# Patient Record
Sex: FEMALE | Race: WHITE | Employment: FULL TIME | URBAN - METROPOLITAN AREA
[De-identification: names, ages, dates, MRNs, and addresses within clinical notes are randomized per-mention and may not be internally consistent; named-entity substitution may affect disease eponyms.]

---

## 2017-01-10 ENCOUNTER — TELEPHONE (OUTPATIENT)
Dept: INTERNAL MEDICINE CLINIC | Facility: CLINIC | Age: 47
End: 2017-01-10

## 2017-01-10 ENCOUNTER — OFFICE VISIT (OUTPATIENT)
Dept: INTERNAL MEDICINE CLINIC | Facility: CLINIC | Age: 47
End: 2017-01-10

## 2017-01-10 VITALS
RESPIRATION RATE: 16 BRPM | HEIGHT: 69 IN | HEART RATE: 73 BPM | SYSTOLIC BLOOD PRESSURE: 146 MMHG | BODY MASS INDEX: 26.96 KG/M2 | DIASTOLIC BLOOD PRESSURE: 90 MMHG | WEIGHT: 182 LBS

## 2017-01-10 DIAGNOSIS — M79.675 PAIN IN TOES OF BOTH FEET: Primary | ICD-10-CM

## 2017-01-10 DIAGNOSIS — M79.674 PAIN IN TOES OF BOTH FEET: Primary | ICD-10-CM

## 2017-01-10 PROCEDURE — 99212 OFFICE O/P EST SF 10 MIN: CPT | Performed by: INTERNAL MEDICINE

## 2017-01-10 RX ORDER — PREDNISONE 20 MG/1
TABLET ORAL
Qty: 6 TABLET | Refills: 0 | Status: SHIPPED | OUTPATIENT
Start: 2017-01-10 | End: 2017-03-02 | Stop reason: ALTCHOICE

## 2017-01-10 NOTE — PROGRESS NOTES
Multiple swollen toes  Is a runner  Had this last year when she had foot sx but no recent foot sx  Was rx by Podiatry with medrol dose pack  Using lamisil for this  Blood pressure 146/90, pulse 73, resp. rate 16, height 5' 9\" (1.753 m), weight 182 lb (82.

## 2017-01-10 NOTE — TELEPHONE ENCOUNTER
Pt stts for the last 3 days her toes have been having excruciating pain in her toes   Pt stts she can't put shoes on her feet.   Please advise

## 2017-01-10 NOTE — TELEPHONE ENCOUNTER
Onset: 3 days. Pt states pain has worsen in the past 3 days. Location: bilateral left and right third toe. Pt described pain as a sharp stabbing pain that occurs only when wearing shoes and/or ambulating.  Pt is a runner and states has been unable to run

## 2017-01-16 ENCOUNTER — OFFICE VISIT (OUTPATIENT)
Dept: PODIATRY CLINIC | Facility: CLINIC | Age: 47
End: 2017-01-16

## 2017-01-16 ENCOUNTER — OFFICE VISIT (OUTPATIENT)
Dept: DERMATOLOGY CLINIC | Facility: CLINIC | Age: 47
End: 2017-01-16

## 2017-01-16 DIAGNOSIS — Z85.828 HISTORY OF NONMELANOMA SKIN CANCER: ICD-10-CM

## 2017-01-16 DIAGNOSIS — D23.60 BENIGN NEOPLASM OF SKIN OF UPPER LIMB, INCLUDING SHOULDER, UNSPECIFIED LATERALITY: ICD-10-CM

## 2017-01-16 DIAGNOSIS — D22.9 MULTIPLE NEVI: Primary | ICD-10-CM

## 2017-01-16 DIAGNOSIS — D23.5 BENIGN NEOPLASM OF SKIN OF TRUNK, EXCEPT SCROTUM: ICD-10-CM

## 2017-01-16 DIAGNOSIS — L97.521: Primary | ICD-10-CM

## 2017-01-16 DIAGNOSIS — L81.4 LENTIGO: ICD-10-CM

## 2017-01-16 DIAGNOSIS — L71.9 ROSACEA: ICD-10-CM

## 2017-01-16 DIAGNOSIS — D23.70 BENIGN NEOPLASM OF SKIN OF LOWER LIMB, INCLUDING HIP, UNSPECIFIED LATERALITY: ICD-10-CM

## 2017-01-16 DIAGNOSIS — D23.30 BENIGN NEOPLASM OF SKIN OF FACE: ICD-10-CM

## 2017-01-16 DIAGNOSIS — D23.4 BENIGN NEOPLASM OF SCALP AND SKIN OF NECK: ICD-10-CM

## 2017-01-16 PROCEDURE — 97597 DBRDMT OPN WND 1ST 20 CM/<: CPT

## 2017-01-16 PROCEDURE — 99212 OFFICE O/P EST SF 10 MIN: CPT | Performed by: DERMATOLOGY

## 2017-01-16 PROCEDURE — 99212 OFFICE O/P EST SF 10 MIN: CPT

## 2017-01-16 PROCEDURE — 99214 OFFICE O/P EST MOD 30 MIN: CPT | Performed by: DERMATOLOGY

## 2017-01-16 RX ORDER — DAPSONE 75 MG/G
GEL TOPICAL
Qty: 90 G | Refills: 12 | Status: SHIPPED | OUTPATIENT
Start: 2017-01-16 | End: 2018-01-10

## 2017-01-16 RX ORDER — FLUOCINONIDE GEL 0.5 MG/G
GEL TOPICAL
Qty: 15 G | Refills: 0 | COMMUNITY
Start: 2017-01-16 | End: 2017-01-19

## 2017-01-16 NOTE — PROGRESS NOTES
HPI:    Patient ID: Stanley Rueda is a 42year old female. Foot Pain   The pain is present in the right toes and left toes (Right 3rd toe - ). This is a new problem. The current episode started 1 to 4 weeks ago (2 weeks).  There has been no history of Mother    • Heart Disorder Father 67     heart attack-cause of death   • Cancer Sister      skin cancer   • Ovarian Cancer Maternal Grandmother 79        Smoking Status: Never Smoker                      Smokeless Status: Never Used likely due to running (shoe rubbing against toes). In office scar tissues were removed at subcutaneous level without any complications.  The patient was recommended to wear OTC inserts (SPENCO INSOLES) all the time as this will reduce pain level and recurre

## 2017-01-19 ENCOUNTER — TELEPHONE (OUTPATIENT)
Dept: DERMATOLOGY CLINIC | Facility: CLINIC | Age: 47
End: 2017-01-19

## 2017-01-19 NOTE — TELEPHONE ENCOUNTER
pt said pharmacy called her and said the tretinoin and the fluocinonide are not covered by ins. pls advise alternative or if we can start a pa for these pls advise

## 2017-01-19 NOTE — TELEPHONE ENCOUNTER
Tretinoin will need pa   For acne, ak's --pt with hx skin ca    For chemoprevention.   Fluocinonide resent rx--looks like this came  up as sample, pt preferred gel, can try cream instead--sub same gm and dir and rf

## 2017-01-30 ENCOUNTER — OFFICE VISIT (OUTPATIENT)
Dept: PODIATRY CLINIC | Facility: CLINIC | Age: 47
End: 2017-01-30

## 2017-01-30 ENCOUNTER — TELEPHONE (OUTPATIENT)
Dept: PODIATRY CLINIC | Facility: CLINIC | Age: 47
End: 2017-01-30

## 2017-01-30 DIAGNOSIS — L97.521: Primary | ICD-10-CM

## 2017-01-30 PROCEDURE — L3000 FT INSERT UCB BERKELEY SHELL: HCPCS

## 2017-01-30 PROCEDURE — 99213 OFFICE O/P EST LOW 20 MIN: CPT

## 2017-01-30 NOTE — PROGRESS NOTES
HPI:    Patient ID: Dion Ramirez is a 42year old female. HPI    1. Right Foot Pain / Traumatic ulcer  The pain is present in the right toes and left toes (Right 3rd toe ). This is a recurrent problem. The current episode 3 weeks ago.  There has been attack-cause of death   • Cancer Sister      skin cancer   • Ovarian Cancer Maternal Grandmother 79        Smoking Status: Never Smoker                      Smokeless Status: Never Used                        Alcohol Use: No                 Comment: Former tissue has been improves since last time as she has been using Orthotic inserts. Denies any new complains. The patient has been casted with partial weight bearing in neutral position. We will contact once we receive Custom orthosis.      No orders of

## 2017-01-31 NOTE — PROGRESS NOTES
Sam Sicard is a 42year old female. HPI:     CC:  Patient presents with:  Lesion: former pt of SD. concerned about crusty lesions to yoni. temples. had them for many years. not changing but \"annoying\"  Full Skin Exam: personal hx of SCC and BCC.  (n Take 75 mg by mouth daily.    Disp:  Rfl: 5   Fluocinonide 0.05 % External Cream Apply twice daily to itchy bumps on side of face Disp: 15 g Rfl: 0   predniSONE 20 MG Oral Tab 2 a day for 2 days then 1 a day for 2 days then stop Disp: 6 tablet Rfl: 0     Al skin cancer   • Ovarian Cancer Maternal Grandmother 70       There were no vitals filed for this visit. HPI:    Patient presents with:  Lesion: former pt of SD. concerned about crusty lesions to yoni. temples. had them for many years.  not changing but \" for acne      Tretinoin 0.05 % External Cream 20 g 3      Sig: Apply to the face, acne  as directed at bedtime.              Multiple nevi  (primary encounter diagnosis)  Lentigo  Benign neoplasm of scalp and skin of neck  Benign neoplasm of skin of face  B reviewed. Followup as noted RTC routine checkup 6 mos - one year or p.r.n.

## 2017-02-16 NOTE — TELEPHONE ENCOUNTER
Pharmacy contacted and confirmed PA for tretinoin was approved. Pharmacy will inform pt. PA sent to scan.

## 2017-03-02 ENCOUNTER — OFFICE VISIT (OUTPATIENT)
Dept: INTERNAL MEDICINE CLINIC | Facility: CLINIC | Age: 47
End: 2017-03-02

## 2017-03-02 VITALS
SYSTOLIC BLOOD PRESSURE: 112 MMHG | WEIGHT: 173 LBS | DIASTOLIC BLOOD PRESSURE: 72 MMHG | HEART RATE: 65 BPM | TEMPERATURE: 98 F | BODY MASS INDEX: 26 KG/M2

## 2017-03-02 DIAGNOSIS — J01.10 ACUTE NON-RECURRENT FRONTAL SINUSITIS: Primary | ICD-10-CM

## 2017-03-02 PROCEDURE — 99214 OFFICE O/P EST MOD 30 MIN: CPT | Performed by: INTERNAL MEDICINE

## 2017-03-02 PROCEDURE — 99212 OFFICE O/P EST SF 10 MIN: CPT | Performed by: INTERNAL MEDICINE

## 2017-03-02 RX ORDER — AMOXICILLIN 875 MG/1
875 TABLET, COATED ORAL 2 TIMES DAILY
Qty: 20 TABLET | Refills: 0 | Status: SHIPPED | OUTPATIENT
Start: 2017-03-02 | End: 2017-03-12

## 2017-03-02 NOTE — PROGRESS NOTES
HPI:    Patient ID: Isabelle Garcia is a 42year old female. Pt presents to the clinic c/o a cough, headache, ear popping, congestion, bodily aches and a fever for the past 10 days. She noted a fever of 100F. Her symptoms have worsened since onset.  She • Heart Disorder Father 67     heart attack-cause of death   • Cancer Sister      skin cancer   • Ovarian Cancer Maternal Grandmother 79        Smoking Status: Never Smoker                      Smokeless Status: Never Used                        Alcohol Normal range of motion. Lymphadenopathy:     She has no cervical adenopathy. Neurological: She is alert. Skin: Skin is dry. Psychiatric: Her behavior is normal.   Nursing note and vitals reviewed.        03/02/17  1543   BP: 112/72   Pulse: 65   Tem

## 2017-03-22 ENCOUNTER — OFFICE VISIT (OUTPATIENT)
Dept: INTERNAL MEDICINE CLINIC | Facility: CLINIC | Age: 47
End: 2017-03-22

## 2017-03-22 VITALS
BODY MASS INDEX: 24.73 KG/M2 | TEMPERATURE: 98 F | HEART RATE: 73 BPM | WEIGHT: 167 LBS | HEIGHT: 69 IN | SYSTOLIC BLOOD PRESSURE: 114 MMHG | RESPIRATION RATE: 18 BRPM | OXYGEN SATURATION: 98 % | DIASTOLIC BLOOD PRESSURE: 72 MMHG

## 2017-03-22 DIAGNOSIS — K21.9 GASTROESOPHAGEAL REFLUX DISEASE WITHOUT ESOPHAGITIS: ICD-10-CM

## 2017-03-22 DIAGNOSIS — J30.9 ALLERGIC RHINITIS, UNSPECIFIED ALLERGIC RHINITIS TRIGGER, UNSPECIFIED RHINITIS SEASONALITY: Primary | ICD-10-CM

## 2017-03-22 PROCEDURE — 99212 OFFICE O/P EST SF 10 MIN: CPT | Performed by: INTERNAL MEDICINE

## 2017-03-22 PROCEDURE — 99214 OFFICE O/P EST MOD 30 MIN: CPT | Performed by: INTERNAL MEDICINE

## 2017-03-22 RX ORDER — PREDNISONE 20 MG/1
TABLET ORAL
Qty: 8 TABLET | Refills: 0 | Status: SHIPPED | OUTPATIENT
Start: 2017-03-22 | End: 2017-10-13

## 2017-03-22 RX ORDER — LORATADINE 10 MG/1
10 TABLET ORAL DAILY
Qty: 30 TABLET | Refills: 1 | Status: SHIPPED | OUTPATIENT
Start: 2017-03-22 | End: 2018-01-23

## 2017-03-22 NOTE — PROGRESS NOTES
HPI:    Patient ID: Zion Styles is a 42year old female. Chest Congestion  This is a recurrent problem. The current episode started more than 1 month ago. The problem has been waxing and waning. Associated symptoms include congestion and fatigue.  P Release Take 1 capsule (40 mg total) by mouth every morning before breakfast. Disp: 30 capsule Rfl: 6   Venlafaxine HCl (EFFEXOR) 75 MG Oral Tab Take 75 mg by mouth daily.    Disp:  Rfl: 5     Allergies:  Azithromycin            Rash   PHYSICAL EXAM:   Phys pressure 114/72, pulse 73, temperature 97.8 °F (36.6 °C), temperature source Oral, resp. rate 18, height 5' 9\" (1.753 m), weight 167 lb (75.751 kg), last menstrual period 03/02/2017, SpO2 98 %, not currently breastfeeding.       Blood pressure 114/72, puls #8860

## 2017-03-27 RX ORDER — ESOMEPRAZOLE MAGNESIUM 40 MG/1
CAPSULE, DELAYED RELEASE ORAL
Qty: 30 CAPSULE | Refills: 2 | Status: SHIPPED | OUTPATIENT
Start: 2017-03-27 | End: 2017-10-13

## 2017-03-27 NOTE — TELEPHONE ENCOUNTER
Refill Protocol Appointment Criteria: Refilled per protocol    · Appointment scheduled in the past 12 months or in the next 3 months  Recent Visits       Provider Department Primary Dx    5 days ago Leticia Vazquez MD [facility], [facility], [address] [place] [address]

## 2017-10-04 ENCOUNTER — TELEPHONE (OUTPATIENT)
Dept: INTERNAL MEDICINE CLINIC | Facility: CLINIC | Age: 47
End: 2017-10-04

## 2017-10-04 DIAGNOSIS — L91.8 SKIN TAG: Primary | ICD-10-CM

## 2017-10-04 NOTE — TELEPHONE ENCOUNTER
Dr Donell Banda wanted to see patient back about her skin tags. Please sign referral order if you agree. Thank you.

## 2017-10-13 ENCOUNTER — OFFICE VISIT (OUTPATIENT)
Dept: INTERNAL MEDICINE CLINIC | Facility: CLINIC | Age: 47
End: 2017-10-13

## 2017-10-13 ENCOUNTER — OFFICE VISIT (OUTPATIENT)
Dept: DERMATOLOGY CLINIC | Facility: CLINIC | Age: 47
End: 2017-10-13

## 2017-10-13 VITALS
BODY MASS INDEX: 26.36 KG/M2 | SYSTOLIC BLOOD PRESSURE: 112 MMHG | RESPIRATION RATE: 18 BRPM | TEMPERATURE: 98 F | WEIGHT: 178 LBS | DIASTOLIC BLOOD PRESSURE: 75 MMHG | HEART RATE: 69 BPM | HEIGHT: 69 IN

## 2017-10-13 DIAGNOSIS — K21.9 GASTROESOPHAGEAL REFLUX DISEASE WITHOUT ESOPHAGITIS: ICD-10-CM

## 2017-10-13 DIAGNOSIS — D23.4 BENIGN NEOPLASM OF SCALP AND SKIN OF NECK: ICD-10-CM

## 2017-10-13 DIAGNOSIS — D22.9 MULTIPLE NEVI: ICD-10-CM

## 2017-10-13 DIAGNOSIS — Z11.1 SCREENING-PULMONARY TB: ICD-10-CM

## 2017-10-13 DIAGNOSIS — L82.1 SEBORRHEIC KERATOSES: ICD-10-CM

## 2017-10-13 DIAGNOSIS — D23.30 BENIGN NEOPLASM OF SKIN OF FACE: ICD-10-CM

## 2017-10-13 DIAGNOSIS — L81.4 LENTIGO: ICD-10-CM

## 2017-10-13 DIAGNOSIS — D48.5 NEOPLASM OF UNCERTAIN BEHAVIOR OF SKIN: Primary | ICD-10-CM

## 2017-10-13 DIAGNOSIS — Z00.00 PHYSICAL EXAM: Primary | ICD-10-CM

## 2017-10-13 DIAGNOSIS — E78.00 PURE HYPERCHOLESTEROLEMIA: ICD-10-CM

## 2017-10-13 DIAGNOSIS — Z12.31 SCREENING MAMMOGRAM, ENCOUNTER FOR: ICD-10-CM

## 2017-10-13 PROCEDURE — 99212 OFFICE O/P EST SF 10 MIN: CPT | Performed by: DERMATOLOGY

## 2017-10-13 PROCEDURE — G0439 PPPS, SUBSEQ VISIT: HCPCS | Performed by: INTERNAL MEDICINE

## 2017-10-13 PROCEDURE — 88305 TISSUE EXAM BY PATHOLOGIST: CPT | Performed by: DERMATOLOGY

## 2017-10-13 PROCEDURE — 99213 OFFICE O/P EST LOW 20 MIN: CPT | Performed by: DERMATOLOGY

## 2017-10-13 PROCEDURE — 99212 OFFICE O/P EST SF 10 MIN: CPT | Performed by: INTERNAL MEDICINE

## 2017-10-13 PROCEDURE — 11100 BIOPSY OF SKIN LESION: CPT | Performed by: DERMATOLOGY

## 2017-10-13 PROCEDURE — 99396 PREV VISIT EST AGE 40-64: CPT | Performed by: INTERNAL MEDICINE

## 2017-10-13 RX ORDER — IBUPROFEN 600 MG/1
600 TABLET ORAL
COMMUNITY
Start: 2009-02-01

## 2017-10-13 RX ORDER — IMIQUIMOD 12.5 MG/.25G
CREAM TOPICAL
Qty: 24 G | Refills: 2 | Status: SHIPPED | OUTPATIENT
Start: 2017-10-13 | End: 2018-01-10

## 2017-10-13 RX ORDER — DOCUSATE SODIUM 100 MG/1
100 CAPSULE, LIQUID FILLED ORAL
COMMUNITY
Start: 2009-02-01 | End: 2018-01-23

## 2017-10-13 RX ORDER — ESOMEPRAZOLE MAGNESIUM 40 MG/1
CAPSULE, DELAYED RELEASE ORAL
Qty: 90 CAPSULE | Refills: 2 | Status: SHIPPED | OUTPATIENT
Start: 2017-10-13 | End: 2018-04-09

## 2017-10-13 NOTE — PROGRESS NOTES
HPI:    Patient ID: Danielle Borden is a 41year old female.   Patient patient presents today for physical exam, states doing well otherwise, denies chest pain, shortness of breath, dyspnea on exertion or heart palpitations also denies nausea, vomiting, di MORNING BEFORE BREAKFAST Disp: 90 capsule Rfl: 2   loratadine 10 MG Oral Tab Take 1 tablet (10 mg total) by mouth daily.  1 tablet orally once daily  for 1-2 weeks than as needed Disp: 30 tablet Rfl: 1   Fluocinonide 0.05 % External Cream Apply twice daily cervical adenopathy. Neurological: She is alert and oriented to person, place, and time. Skin: Skin is warm and dry. Psychiatric: She has a normal mood and affect.  Her speech is normal and behavior is normal. Judgment and thought content normal. Her [E]      Quantiferon TB [E]      Flulaval 0.5 ml 6 mon and older Quad single dose PF (60201)    Meds This Visit:  Signed Prescriptions Disp Refills    Esomeprazole Magnesium 40 MG Oral Capsule Delayed Release 90 capsule 2      Sig: TAKE 1 CAPSULE(40 MG) BY

## 2017-10-17 NOTE — PROGRESS NOTES
The pathology report from last visit showed prurigo nodule--no evidence of skin cancer  . Please log in test results, send biopsy results letter. Pt to rtc 1 year or prn.

## 2017-10-23 NOTE — PROGRESS NOTES
Trinh Humphrey is a 41year old female. HPI:     CC:  Patient presents with:  Lesion: \" Established Pt\"- LOV- 1-16-17. Pt presents with crusty lesions to bilateral temples that had for many years treated at last visit with cryo.  Pt notes annoying and BREAKFAST Disp: 90 capsule Rfl: 2   Imiquimod 5 % External Cream Apply topically 2 times every week to affected area(s) on forehead x 6 weeks. #24 pack Disp: 24 g Rfl: 2   loratadine 10 MG Oral Tab Take 1 tablet (10 mg total) by mouth daily.  1 tablet oral Reaction to local anesthetic No     Social History Narrative   None on file     Family History   Problem Relation Age of Onset   • Ovarian Cancer Mother      per NG   • Cancer Mother      skin cancer ( BCC, SCC)   • Other [place] Dy Mother    • Heart Disorder Refills for this Visit:  Signed Prescriptions Disp Refills    Imiquimod 5 % External Cream 24 g 2      Sig: Apply topically 2 times every week to affected area(s) on forehead x 6 weeks.   #24 pack             Neoplasm of uncertain behavior of skin  (primary

## 2017-10-23 NOTE — PROGRESS NOTES
Operative Report                     Shave Biopsy     Clinical diagnosis:  Location:  chronic crusted papule, persistant post cryo  Spec 1 Description >>>>>: left preauricular  Spec 1 Comment: r/o SCC  Size of lesion: 1 cm      Procedure:     With pat

## 2018-01-09 ENCOUNTER — TELEPHONE (OUTPATIENT)
Dept: PODIATRY CLINIC | Facility: CLINIC | Age: 48
End: 2018-01-09

## 2018-01-09 ENCOUNTER — TELEPHONE (OUTPATIENT)
Dept: INTERNAL MEDICINE CLINIC | Facility: CLINIC | Age: 48
End: 2018-01-09

## 2018-01-09 DIAGNOSIS — M79.673 PAIN OF FOOT, UNSPECIFIED LATERALITY: Primary | ICD-10-CM

## 2018-01-09 RX ORDER — PREDNISONE 20 MG/1
TABLET ORAL
Qty: 6 TABLET | Refills: 0 | Status: CANCELLED | OUTPATIENT
Start: 2018-01-09

## 2018-01-09 NOTE — TELEPHONE ENCOUNTER
Spoke with patient (name and  verified), reviewed information, patient verbalized understanding and agrees with plan.   Relayed Dr Bree Carney message, patient plans to go to [address] [place] [address] today, [facility] or [place]

## 2018-01-09 NOTE — TELEPHONE ENCOUNTER
Need  To  Be  Seen  For  Her  sy and treatment   -    Dr Addie Gore  -   Our podiatrist   referal in system   Or  Pt   Can  Go to - IC   Tonight

## 2018-01-10 ENCOUNTER — HOSPITAL ENCOUNTER (OUTPATIENT)
Age: 48
Discharge: HOME | End: 2018-01-10
Attending: FAMILY MEDICINE
Payer: COMMERCIAL

## 2018-01-10 VITALS
SYSTOLIC BLOOD PRESSURE: 126 MMHG | WEIGHT: 178 LBS | RESPIRATION RATE: 18 BRPM | HEART RATE: 62 BPM | OXYGEN SATURATION: 100 % | BODY MASS INDEX: 26.36 KG/M2 | HEIGHT: 69 IN | TEMPERATURE: 97 F | DIASTOLIC BLOOD PRESSURE: 76 MMHG

## 2018-01-10 DIAGNOSIS — M79.674 PAIN IN TOES OF BOTH FEET: Primary | ICD-10-CM

## 2018-01-10 DIAGNOSIS — M79.675 PAIN IN TOES OF BOTH FEET: Primary | ICD-10-CM

## 2018-01-10 PROCEDURE — 99213 OFFICE O/P EST LOW 20 MIN: CPT

## 2018-01-10 PROCEDURE — 99214 OFFICE O/P EST MOD 30 MIN: CPT

## 2018-01-10 RX ORDER — PREDNISONE 20 MG/1
TABLET ORAL
Qty: 6 TABLET | Refills: 0 | Status: SHIPPED | OUTPATIENT
Start: 2018-01-10 | End: 2018-01-23

## 2018-01-10 NOTE — ED PROVIDER NOTES
Patient Seen in: [address]    History   Patient presents with:  Lower Extremity Injury (musculoskeletal)    Stated Complaint: swollen toes, feet pain    HPI    Patient here with complains of slightly swollen toes and fe ED Triage Vitals [01/10/18 1312]  BP: 126/76  Pulse: 62  Resp: 18  Temp: (!) 97.3 °F (36.3 °C)  Temp src: Oral  SpO2: 100 %  O2 Device: None (Room air)    Current:/76   Pulse 62   Temp (!) 97.3 °F (36.3 °C) (Oral)   Resp 18   Ht 175.3 cm (5' 9\")

## 2018-01-10 NOTE — ED INITIAL ASSESSMENT (HPI)
Patient c/o foot pain more predominantly in her toes; toes appear to be swollen and purplish in colo; has bunionectomy 2 years ago and has had 2 of these episodes ever since in which her PCP pur her on prednisone and it resolved.  Patient states when she's

## 2018-01-14 ENCOUNTER — LAB ENCOUNTER (OUTPATIENT)
Dept: LAB | Facility: HOSPITAL | Age: 48
End: 2018-01-14
Attending: INTERNAL MEDICINE
Payer: COMMERCIAL

## 2018-01-14 ENCOUNTER — HOSPITAL ENCOUNTER (OUTPATIENT)
Dept: MAMMOGRAPHY | Facility: HOSPITAL | Age: 48
Discharge: HOME | End: 2018-01-14
Attending: INTERNAL MEDICINE
Payer: COMMERCIAL

## 2018-01-14 DIAGNOSIS — Z11.1 SCREENING-PULMONARY TB: ICD-10-CM

## 2018-01-14 DIAGNOSIS — E78.00 PURE HYPERCHOLESTEROLEMIA: ICD-10-CM

## 2018-01-14 DIAGNOSIS — Z12.31 SCREENING MAMMOGRAM, ENCOUNTER FOR: ICD-10-CM

## 2018-01-14 DIAGNOSIS — Z00.00 PHYSICAL EXAM: ICD-10-CM

## 2018-01-14 DIAGNOSIS — K21.9 GASTROESOPHAGEAL REFLUX DISEASE WITHOUT ESOPHAGITIS: ICD-10-CM

## 2018-01-14 LAB
ALBUMIN SERPL BCP-MCNC: 4 G/DL (ref 3.5–4.8)
ALBUMIN/GLOB SERPL: 1.7 {RATIO} (ref 1–2)
ALP SERPL-CCNC: 47 U/L (ref 32–100)
ALT SERPL-CCNC: 26 U/L (ref 14–54)
ANION GAP SERPL CALC-SCNC: 7 MMOL/L (ref 0–18)
AST SERPL-CCNC: 19 U/L (ref 15–41)
BASOPHILS # BLD: 0.1 K/UL (ref 0–0.2)
BASOPHILS NFR BLD: 1 %
BILIRUB SERPL-MCNC: 0.8 MG/DL (ref 0.3–1.2)
BUN SERPL-MCNC: 18 MG/DL (ref 8–20)
BUN/CREAT SERPL: 20.5 (ref 10–20)
CALCIUM SERPL-MCNC: 9.2 MG/DL (ref 8.5–10.5)
CHLORIDE SERPL-SCNC: 105 MMOL/L (ref 95–110)
CHOLEST SERPL-MCNC: 226 MG/DL (ref 110–200)
CO2 SERPL-SCNC: 26 MMOL/L (ref 22–32)
CREAT SERPL-MCNC: 0.88 MG/DL (ref 0.5–1.5)
EOSINOPHIL # BLD: 0.1 K/UL (ref 0–0.7)
EOSINOPHIL NFR BLD: 2 %
ERYTHROCYTE [DISTWIDTH] IN BLOOD BY AUTOMATED COUNT: 12.7 % (ref 11–15)
GLOBULIN PLAS-MCNC: 2.3 G/DL (ref 2.5–3.7)
GLUCOSE SERPL-MCNC: 90 MG/DL (ref 70–99)
HCT VFR BLD AUTO: 42.6 % (ref 35–48)
HDLC SERPL-MCNC: 78 MG/DL
HGB BLD-MCNC: 14.1 G/DL (ref 12–16)
LDLC SERPL CALC-MCNC: 133 MG/DL (ref 0–99)
LYMPHOCYTES # BLD: 2.5 K/UL (ref 1–4)
LYMPHOCYTES NFR BLD: 34 %
MCH RBC QN AUTO: 31.3 PG (ref 27–32)
MCHC RBC AUTO-ENTMCNC: 33.1 G/DL (ref 32–37)
MCV RBC AUTO: 94.4 FL (ref 80–100)
MONOCYTES # BLD: 0.7 K/UL (ref 0–1)
MONOCYTES NFR BLD: 9 %
NEUTROPHILS # BLD AUTO: 4 K/UL (ref 1.8–7.7)
NEUTROPHILS NFR BLD: 55 %
NONHDLC SERPL-MCNC: 148 MG/DL
OSMOLALITY UR CALC.SUM OF ELEC: 287 MOSM/KG (ref 275–295)
PLATELET # BLD AUTO: 206 K/UL (ref 140–400)
PMV BLD AUTO: 10.7 FL (ref 7.4–10.3)
POTASSIUM SERPL-SCNC: 3.3 MMOL/L (ref 3.3–5.1)
PROT SERPL-MCNC: 6.3 G/DL (ref 5.9–8.4)
RBC # BLD AUTO: 4.51 M/UL (ref 3.7–5.4)
RBC #/AREA URNS AUTO: 1 /HPF
SODIUM SERPL-SCNC: 138 MMOL/L (ref 136–144)
TRIGL SERPL-MCNC: 74 MG/DL (ref 1–149)
TSH SERPL-ACNC: 2.87 UIU/ML (ref 0.45–5.33)
WBC # BLD AUTO: 7.4 K/UL (ref 4–11)
WBC #/AREA URNS AUTO: 1 /HPF

## 2018-01-14 PROCEDURE — 85025 COMPLETE CBC W/AUTO DIFF WBC: CPT

## 2018-01-14 PROCEDURE — 84443 ASSAY THYROID STIM HORMONE: CPT

## 2018-01-14 PROCEDURE — 80053 COMPREHEN METABOLIC PANEL: CPT

## 2018-01-14 PROCEDURE — 80061 LIPID PANEL: CPT

## 2018-01-14 PROCEDURE — 86480 TB TEST CELL IMMUN MEASURE: CPT

## 2018-01-14 PROCEDURE — 81015 MICROSCOPIC EXAM OF URINE: CPT

## 2018-01-14 PROCEDURE — 77067 SCR MAMMO BI INCL CAD: CPT | Performed by: INTERNAL MEDICINE

## 2018-01-14 PROCEDURE — 36415 COLL VENOUS BLD VENIPUNCTURE: CPT

## 2018-01-15 LAB
M TB IFN-G CD4+ BCKGRND COR BLD-ACNC: -0.01 IU/ML
M TB IFN-G CD4+ T-CELLS BLD-ACNC: 0.05 IU/ML
M TB TUBERC IFN-G BLD QL: NEGATIVE
M TB TUBERC IGNF/MITOGEN IGNF CONTROL: >10 IU/ML

## 2018-01-17 ENCOUNTER — TELEPHONE (OUTPATIENT)
Dept: OTHER | Age: 48
End: 2018-01-17

## 2018-01-17 DIAGNOSIS — M79.89 SWELLING OF BOTH HANDS: Primary | ICD-10-CM

## 2018-01-17 NOTE — TELEPHONE ENCOUNTER
----- Message from Jose Rodarte MD sent at 1/17/2018  7:20 AM CST -----  Please call patient with blood test results that are all normal/stable  Except elevated cholesterol mildly-advised low-fat diet and exercise regularly  Kidneys and liver normal

## 2018-01-17 NOTE — TELEPHONE ENCOUNTER
Verified name and . Results/recommendations reviewed with pt and pt verb understanding. Pt asking for letter stating she is negative for TB so she can give to her employer. Letter mailed to pt home address on file.     Pt is also asking if she can h

## 2018-01-18 NOTE — TELEPHONE ENCOUNTER
Pt is requesting a referral for rheumatology. Should pt be seen first, order pend for approval. Thank you. Richie Tyler Please respond to pool: ABRIL LEMOS LMB LPN/CMA

## 2018-01-23 ENCOUNTER — HOSPITAL ENCOUNTER (OUTPATIENT)
Age: 48
Discharge: HOME | End: 2018-01-23
Attending: FAMILY MEDICINE
Payer: COMMERCIAL

## 2018-01-23 VITALS
BODY MASS INDEX: 25.92 KG/M2 | HEART RATE: 74 BPM | SYSTOLIC BLOOD PRESSURE: 133 MMHG | OXYGEN SATURATION: 99 % | DIASTOLIC BLOOD PRESSURE: 75 MMHG | TEMPERATURE: 98 F | WEIGHT: 175 LBS | HEIGHT: 69 IN | RESPIRATION RATE: 20 BRPM

## 2018-01-23 DIAGNOSIS — J02.9 VIRAL PHARYNGITIS: Primary | ICD-10-CM

## 2018-01-23 LAB — S PYO AG THROAT QL: NEGATIVE

## 2018-01-23 PROCEDURE — 99213 OFFICE O/P EST LOW 20 MIN: CPT

## 2018-01-23 PROCEDURE — 99212 OFFICE O/P EST SF 10 MIN: CPT

## 2018-01-23 PROCEDURE — 87430 STREP A AG IA: CPT

## 2018-01-23 NOTE — ED INITIAL ASSESSMENT (HPI)
Pt presents to the IC with c/o a sore throat, hoarse voice, and \"not feeling well\" since Thursday. +exhaustion. Low grade temp in the 99's since feeling sick.

## 2018-01-23 NOTE — ED PROVIDER NOTES
Patient presents with:  Sore Throat      HPI:     Gloria Lvoe is a 41year old female who presents with for chief complaint of nasal congestion, sore throat, hoarse voice, mild fatigue  X 4 days.     The patient denies complaints of fevers, chills, swe HPI.  Constitutional and vital signs reviewed. All other systems reviewed and negative except as noted above. PSFH elements reviewed from today and agreed except as otherwise stated in HPI.         Physical Exam:     Findings:    /75   Pulse 7

## 2018-01-30 ENCOUNTER — OFFICE VISIT (OUTPATIENT)
Dept: PODIATRY CLINIC | Facility: CLINIC | Age: 48
End: 2018-01-30

## 2018-01-30 DIAGNOSIS — I73.00 RAYNAUD'S DISEASE WITHOUT GANGRENE: Primary | ICD-10-CM

## 2018-01-30 PROCEDURE — 99213 OFFICE O/P EST LOW 20 MIN: CPT | Performed by: PODIATRIST

## 2018-01-30 PROCEDURE — 99212 OFFICE O/P EST SF 10 MIN: CPT | Performed by: PODIATRIST

## 2018-01-30 NOTE — PROGRESS NOTES
HPI:    Patient ID: Froylan Simpson is a 41year old female. HPI  This 42-year-old female presents as a new patient to me although she has been seen in the past by Dr. Romulo Avery.   Over the last several years she has noticed a rather dramatic and signific indicates an understanding and will follow-up as appropriate         ASSESSMENT/PLAN:   Raynaud's disease without gangrene  (primary encounter diagnosis)    No orders of the defined types were placed in this encounter.       Meds This Visit:  No prescriptio

## 2018-02-07 ENCOUNTER — OFFICE VISIT (OUTPATIENT)
Dept: RHEUMATOLOGY | Facility: CLINIC | Age: 48
End: 2018-02-07

## 2018-02-07 ENCOUNTER — APPOINTMENT (OUTPATIENT)
Dept: LAB | Facility: HOSPITAL | Age: 48
End: 2018-02-07
Attending: INTERNAL MEDICINE
Payer: COMMERCIAL

## 2018-02-07 VITALS
HEART RATE: 74 BPM | BODY MASS INDEX: 27.13 KG/M2 | DIASTOLIC BLOOD PRESSURE: 84 MMHG | SYSTOLIC BLOOD PRESSURE: 131 MMHG | WEIGHT: 183.19 LBS | HEIGHT: 69 IN

## 2018-02-07 DIAGNOSIS — T69.1XXA CHILBLAINS, INITIAL ENCOUNTER: ICD-10-CM

## 2018-02-07 DIAGNOSIS — I73.00 RAYNAUD'S DISEASE WITHOUT GANGRENE: ICD-10-CM

## 2018-02-07 DIAGNOSIS — M21.619 BUNION OF UNSPECIFIED FOOT: Primary | ICD-10-CM

## 2018-02-07 LAB
C3 SERPL-MCNC: 122 MG/DL (ref 88–201)
C4 SERPL-MCNC: 28 MG/DL (ref 18–55)
CRP SERPL-MCNC: 0.5 MG/DL (ref 0–0.9)
ERYTHROCYTE [SEDIMENTATION RATE] IN BLOOD: 5 MM/HR (ref 0–20)
RHEUMATOID FACT SER QL: <5 IU/ML

## 2018-02-07 PROCEDURE — 86140 C-REACTIVE PROTEIN: CPT

## 2018-02-07 PROCEDURE — 86160 COMPLEMENT ANTIGEN: CPT

## 2018-02-07 PROCEDURE — 86146 BETA-2 GLYCOPROTEIN ANTIBODY: CPT

## 2018-02-07 PROCEDURE — 86235 NUCLEAR ANTIGEN ANTIBODY: CPT

## 2018-02-07 PROCEDURE — 99212 OFFICE O/P EST SF 10 MIN: CPT | Performed by: INTERNAL MEDICINE

## 2018-02-07 PROCEDURE — 84165 PROTEIN E-PHORESIS SERUM: CPT

## 2018-02-07 PROCEDURE — 86147 CARDIOLIPIN ANTIBODY EA IG: CPT

## 2018-02-07 PROCEDURE — 99244 OFF/OP CNSLTJ NEW/EST MOD 40: CPT | Performed by: INTERNAL MEDICINE

## 2018-02-07 PROCEDURE — 86038 ANTINUCLEAR ANTIBODIES: CPT

## 2018-02-07 PROCEDURE — 86225 DNA ANTIBODY NATIVE: CPT

## 2018-02-07 PROCEDURE — 86200 CCP ANTIBODY: CPT

## 2018-02-07 PROCEDURE — 85730 THROMBOPLASTIN TIME PARTIAL: CPT

## 2018-02-07 PROCEDURE — 85613 RUSSELL VIPER VENOM DILUTED: CPT

## 2018-02-07 PROCEDURE — 85610 PROTHROMBIN TIME: CPT

## 2018-02-07 PROCEDURE — 36415 COLL VENOUS BLD VENIPUNCTURE: CPT

## 2018-02-07 PROCEDURE — 86431 RHEUMATOID FACTOR QUANT: CPT

## 2018-02-07 PROCEDURE — 85390 FIBRINOLYSINS SCREEN I&R: CPT

## 2018-02-07 PROCEDURE — 85652 RBC SED RATE AUTOMATED: CPT

## 2018-02-07 RX ORDER — AMLODIPINE BESYLATE 5 MG/1
5 TABLET ORAL DAILY
Refills: 3 | COMMUNITY
Start: 2018-02-05

## 2018-02-07 NOTE — PROGRESS NOTES
Dear Tariq Heller:    I saw your patient Palak Wei in consultation this afternoon at your request for evaluation of Raynauds. 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   She arthritis. Social history:  She is  with 2 children. She is a .  She does administrative work to teaching. Occasional cigarettes. No alcohol. 3 cups of coffee a day. She exercises 3-4 days a week.   She is training for [employer] [facility] [employer] which so far seems helpful. She is concerned about underlying autoimmune disorder, since rheumatoid arthritis and lupus run in her family. I ordered many blood tests. I will give her a call with the results, hopefully with good news.     2.  Status pos

## 2018-02-08 LAB
CARDIOLIPIN IGA SERPL-ACNC: 0.4 APL (ref 0–11.9)
CARDIOLIPIN IGG SERPL-ACNC: 2.8 GPL (ref 0–14.9)
CARDIOLIPIN IGM SERPL-ACNC: 4.7 MPL (ref 0–12.4)
CCP IGG SERPL-ACNC: 1 U/ML (ref 0–6.9)
DSDNA AB TITR SER: <10 {TITER}
NUCLEAR IGG TITR SER IF: NEGATIVE {TITER}

## 2018-02-09 LAB
ALBUMIN SERPL ELPH-MCNC: 4.59 G/DL (ref 3.75–5.21)
ALBUMIN/GLOB SERPL: 1.9 {RATIO} (ref 1–2)
ALPHA1 GLOB SERPL ELPH-MCNC: 0.29 G/DL (ref 0.19–0.46)
ALPHA2 GLOB SERPL ELPH-MCNC: 0.57 G/DL (ref 0.48–1.05)
APTT PPP: 30.3 SECONDS (ref 23.2–35.3)
B-GLOBULIN SERPL ELPH-MCNC: 0.76 G/DL (ref 0.68–1.23)
CONFIRM APTT STACLOT: NEGATIVE
CONFIRM DRVVT: 0.9 S (ref 0–1.1)
GAMMA GLOB SERPL ELPH-MCNC: 0.8 G/DL (ref 0.62–1.7)
PROTHROMBIN TIME: 12.7 SECONDS (ref 11.8–14.5)
TOTAL PROTEIN (SPECIAL TESTING): 7 G/DL (ref 6.5–9.1)

## 2018-02-10 LAB
BETA-2 GLYCOPROTEIN I AB, IGG: 0 SGU
BETA-2 GLYCOPROTEIN I AB, IGM: 2 SMU
SCLERODERMA (SCL-70) (ENA) AB, IGG: 0 AU/ML

## 2018-02-13 LAB
ENA SM+RNP AB SER QL: NEGATIVE
ENA SS-A AB SER QL IA: NEGATIVE
ENA SS-B AB SER QL IA: NEGATIVE

## 2018-02-22 ENCOUNTER — TELEPHONE (OUTPATIENT)
Dept: RHEUMATOLOGY | Facility: CLINIC | Age: 48
End: 2018-02-22

## 2018-02-27 ENCOUNTER — TELEPHONE (OUTPATIENT)
Dept: RHEUMATOLOGY | Facility: CLINIC | Age: 48
End: 2018-02-27

## 2018-02-27 NOTE — TELEPHONE ENCOUNTER
I gave Nicolasa Swift a call with her lab results from February 7th of 2018. SPEP was normal.  Lupus anticoagulant, anticardiolipin antibody, and beta-2 glycoprotein 1 antibodies were negative. KLAUS was negative. SSA, SSB, Huffman/RNP, scleroderma 70, and double-stranded DNA antibodies were negative. Interestingly, Norist Willy was weakly positive. C3 and C4 complements were normal.  C-reactive protein was normal at 0.5 and sed rate was normal at 5. Rheumatoid factor and CCP antibody were negative. I reassured her that she does not appear to have an underlying autoimmune disorder. She is doing great on amlodipine 5 mg a day, and will continue it through the winter months. She will let us know if she should develop other symptoms. She has a very positive family history for autoimmune disorders, so it is possible she may go on to develop one. I'll be glad to see her back if needed. This communication was sent on to Dr. Bhargavi Sandoval.

## 2018-03-08 ENCOUNTER — TELEPHONE (OUTPATIENT)
Dept: OTHER | Age: 48
End: 2018-03-08

## 2018-03-08 NOTE — TELEPHONE ENCOUNTER
Pt asking if doctor will sign a form stating that she has had a px. Pt will drop form off if doctor approves. Last px 10/13/17.     Please respond to pool: ABRIL BENITO LPN/BACILIO

## 2018-03-09 NOTE — TELEPHONE ENCOUNTER
Pt is calling for status of her form. Please, call pt at [phone]. Pt states that she needs this before Tuesday and would like a call back today. Please, let pt know if the doctor needs to see the patient in order to fill out the form.

## 2018-03-12 NOTE — TELEPHONE ENCOUNTER
Pt was contacted and informed that the form is ready for  at the [facility] location at the  and understanding was voiced.

## 2018-04-03 ENCOUNTER — TELEPHONE (OUTPATIENT)
Dept: INTERNAL MEDICINE CLINIC | Facility: CLINIC | Age: 48
End: 2018-04-03

## 2018-04-04 NOTE — TELEPHONE ENCOUNTER
PA for Esomeprazole Magnesium 40 mg cap completed with [employer] via CMM response time 3-5 business days Larry Moncada 23.

## 2018-04-09 RX ORDER — OMEPRAZOLE 40 MG/1
40 CAPSULE, DELAYED RELEASE ORAL DAILY
Qty: 90 CAPSULE | Refills: 1 | Status: SHIPPED | OUTPATIENT
Start: 2018-04-09 | End: 2018-12-21

## 2018-08-09 ENCOUNTER — OFFICE VISIT (OUTPATIENT)
Dept: INTERNAL MEDICINE CLINIC | Facility: CLINIC | Age: 48
End: 2018-08-09

## 2018-08-09 VITALS
BODY MASS INDEX: 27.08 KG/M2 | SYSTOLIC BLOOD PRESSURE: 118 MMHG | TEMPERATURE: 98 F | RESPIRATION RATE: 20 BRPM | DIASTOLIC BLOOD PRESSURE: 74 MMHG | HEART RATE: 73 BPM | WEIGHT: 182.81 LBS | HEIGHT: 69 IN

## 2018-08-09 DIAGNOSIS — I10 ESSENTIAL HYPERTENSION: ICD-10-CM

## 2018-08-09 DIAGNOSIS — R23.9 SKIN CHANGE: ICD-10-CM

## 2018-08-09 DIAGNOSIS — G51.4 FACIAL TWITCHING: Primary | ICD-10-CM

## 2018-08-09 DIAGNOSIS — L68.9 HYPERTRICHOSIS: ICD-10-CM

## 2018-08-09 DIAGNOSIS — F41.9 ANXIETY: ICD-10-CM

## 2018-08-09 PROCEDURE — 99214 OFFICE O/P EST MOD 30 MIN: CPT | Performed by: INTERNAL MEDICINE

## 2018-08-09 PROCEDURE — 99212 OFFICE O/P EST SF 10 MIN: CPT | Performed by: INTERNAL MEDICINE

## 2018-08-09 RX ORDER — ESOMEPRAZOLE MAGNESIUM 40 MG/1
40 CAPSULE, DELAYED RELEASE ORAL DAILY
COMMUNITY
Start: 2017-12-04 | End: 2019-01-02

## 2018-08-09 NOTE — PROGRESS NOTES
HPI:    Patient ID: Navin Deal is a 57year old female. Pt state that she has a twitch that started withi her right eye and that is traveling to the let side of her face comes  And goes     Anxiety   This is a chronic problem.  The current episode st oriented to person, place, and time. She appears well-developed and well-nourished. No distress. HENT:   Head: Normocephalic and atraumatic.    Right Ear: Tympanic membrane, external ear and ear canal normal.   Left Ear: Tympanic membrane, external ear an effexor 150 mg   Discuss  With psychiatrist     Skin change  Sweating hot flashes -  premenopausal  sy ?   Pt educaiton Derm   Yearly  Exams       Orders Placed This Encounter      Comp Metabolic Panel (14) [E]      TSH W Reflex To Free T4 [E]      Vitamin

## 2018-09-10 ENCOUNTER — APPOINTMENT (OUTPATIENT)
Dept: LAB | Facility: HOSPITAL | Age: 48
End: 2018-09-10
Attending: INTERNAL MEDICINE
Payer: COMMERCIAL

## 2018-09-10 DIAGNOSIS — G51.4 FACIAL TWITCHING: ICD-10-CM

## 2018-09-10 LAB
ALBUMIN SERPL BCP-MCNC: 4 G/DL (ref 3.5–4.8)
ALBUMIN/GLOB SERPL: 1.8 {RATIO} (ref 1–2)
ALP SERPL-CCNC: 46 U/L (ref 32–100)
ALT SERPL-CCNC: 22 U/L (ref 14–54)
ANION GAP SERPL CALC-SCNC: 7 MMOL/L (ref 0–18)
AST SERPL-CCNC: 22 U/L (ref 15–41)
BILIRUB SERPL-MCNC: 0.6 MG/DL (ref 0.3–1.2)
BUN SERPL-MCNC: 13 MG/DL (ref 8–20)
BUN/CREAT SERPL: 18.8 (ref 10–20)
CALCIUM SERPL-MCNC: 9 MG/DL (ref 8.5–10.5)
CHLORIDE SERPL-SCNC: 107 MMOL/L (ref 95–110)
CO2 SERPL-SCNC: 25 MMOL/L (ref 22–32)
CREAT SERPL-MCNC: 0.69 MG/DL (ref 0.5–1.5)
FERRITIN SERPL IA-MCNC: 14 NG/ML (ref 11–307)
GLOBULIN PLAS-MCNC: 2.2 G/DL (ref 2.5–3.7)
GLUCOSE SERPL-MCNC: 86 MG/DL (ref 70–99)
MAGNESIUM SERPL-MCNC: 2.1 MG/DL (ref 1.8–2.5)
OSMOLALITY UR CALC.SUM OF ELEC: 287 MOSM/KG (ref 275–295)
PATIENT FASTING: NO
POTASSIUM SERPL-SCNC: 3.5 MMOL/L (ref 3.3–5.1)
PROT SERPL-MCNC: 6.2 G/DL (ref 5.9–8.4)
SODIUM SERPL-SCNC: 139 MMOL/L (ref 136–144)
TSH SERPL-ACNC: 1.61 UIU/ML (ref 0.45–5.33)
VIT B12 SERPL-MCNC: 553 PG/ML (ref 181–914)

## 2018-09-10 PROCEDURE — 80053 COMPREHEN METABOLIC PANEL: CPT

## 2018-09-10 PROCEDURE — 82607 VITAMIN B-12: CPT

## 2018-09-10 PROCEDURE — 82728 ASSAY OF FERRITIN: CPT

## 2018-09-10 PROCEDURE — 83735 ASSAY OF MAGNESIUM: CPT

## 2018-09-10 PROCEDURE — 84443 ASSAY THYROID STIM HORMONE: CPT

## 2018-09-10 PROCEDURE — 36415 COLL VENOUS BLD VENIPUNCTURE: CPT

## 2018-09-18 ENCOUNTER — OFFICE VISIT (OUTPATIENT)
Dept: NEUROLOGY | Facility: CLINIC | Age: 48
End: 2018-09-18

## 2018-09-18 ENCOUNTER — TELEPHONE (OUTPATIENT)
Dept: NEUROLOGY | Facility: CLINIC | Age: 48
End: 2018-09-18

## 2018-09-18 VITALS
DIASTOLIC BLOOD PRESSURE: 66 MMHG | SYSTOLIC BLOOD PRESSURE: 110 MMHG | HEIGHT: 69 IN | HEART RATE: 90 BPM | BODY MASS INDEX: 25.92 KG/M2 | WEIGHT: 175 LBS

## 2018-09-18 DIAGNOSIS — G51.39 HEMIFACIAL SPASM: Primary | ICD-10-CM

## 2018-09-18 PROCEDURE — 99204 OFFICE O/P NEW MOD 45 MIN: CPT | Performed by: OTHER

## 2018-09-18 NOTE — PROGRESS NOTES
Neurology Initial Visit     Referred By: Dr. Novak ref. provider found    Chief Complaint: Patient presents with:  Tics: Referred by [facility] pt is here for consult facial twiching in the R side for 6 months on and off.  Per pt sometime is the left side o Ativan       Family History   Problem Relation Age of Onset   • Ovarian Cancer Mother         per NG   • Cancer Mother         skin cancer ( BCC, SCC)   • Other (Other) Mother         Lupus   • Other (RA) Mother    • Heart Disorder Father 67        heart a right side  VIII. Hearing intact to whisper, tuning fork or finger rub. IX. Pallet elevates symmetrically. XI. Shoulder shrug is intact  XII.  Tongue is midline    Motor Exam:  Muscle tone normal  No atrophy or fasciculations  Strength- upper extremities Return to clinic in: No Follow-up on file.     Sara Fabian MD

## 2018-09-18 NOTE — TELEPHONE ENCOUNTER
L/m advising Pt. insurance was verified and MRI brain w/wo cpt code 95051 is a covered benefit and does not require authorization. Can proceed with scheduling appt.

## 2018-09-27 ENCOUNTER — HOSPITAL ENCOUNTER (OUTPATIENT)
Dept: MRI IMAGING | Facility: HOSPITAL | Age: 48
Discharge: HOME | End: 2018-09-27
Attending: Other
Payer: COMMERCIAL

## 2018-09-27 DIAGNOSIS — G51.39 HEMIFACIAL SPASM: ICD-10-CM

## 2018-09-27 PROCEDURE — A9575 INJ GADOTERATE MEGLUMI 0.1ML: HCPCS | Performed by: OTHER

## 2018-09-27 PROCEDURE — 70553 MRI BRAIN STEM W/O & W/DYE: CPT | Performed by: OTHER

## 2018-09-28 ENCOUNTER — TELEPHONE (OUTPATIENT)
Dept: NEUROLOGY | Facility: CLINIC | Age: 48
End: 2018-09-28

## 2018-09-28 DIAGNOSIS — G51.39 HEMIFACIAL SPASM: Primary | ICD-10-CM

## 2018-09-28 NOTE — TELEPHONE ENCOUNTER
Results per Dr Michelle Major given t pt. Pt stated that the facial spasm and twitching is annoying and she would like to try Botox injection as recommended by Dr Michelle Major . Dr Dalia Gonzalez updated so order can be written.

## 2018-09-28 NOTE — TELEPHONE ENCOUNTER
----- Message from Lucas Kumar MD sent at 9/28/2018  8:38 AM CDT -----  Please let patient know that MRI brain didn't show significant abnormalities to explain her hemifacial spasm, her slightly lower hanging cerebellum unlikely to explain any of

## 2018-10-03 ENCOUNTER — TELEPHONE (OUTPATIENT)
Dept: NEUROLOGY | Facility: CLINIC | Age: 48
End: 2018-10-03

## 2018-10-03 NOTE — TELEPHONE ENCOUNTER
Received in basket from [email or web address]  Advising of approval for Botox 100 u/vl for one unit/DOS. Will call Pt. To inform. L/m advising of approval. Await return call for scheduling appt.

## 2018-10-09 ENCOUNTER — OFFICE VISIT (OUTPATIENT)
Dept: NEUROLOGY | Facility: CLINIC | Age: 48
End: 2018-10-09

## 2018-10-09 VITALS
HEIGHT: 69 IN | SYSTOLIC BLOOD PRESSURE: 120 MMHG | BODY MASS INDEX: 25.92 KG/M2 | WEIGHT: 175 LBS | DIASTOLIC BLOOD PRESSURE: 72 MMHG | HEART RATE: 70 BPM

## 2018-10-09 DIAGNOSIS — G51.39 HEMIFACIAL SPASM: ICD-10-CM

## 2018-10-09 PROCEDURE — 95874 GUIDE NERV DESTR NEEDLE EMG: CPT | Performed by: OTHER

## 2018-10-09 PROCEDURE — 64612 DESTROY NERVE FACE MUSCLE: CPT | Performed by: OTHER

## 2018-10-09 NOTE — PROCEDURES
We did Botox injections for hemifacial spasm today. PROCEDURE: Botox Injections (Hemifacial spasm PROTOCOL)   PRE-OPERATIVE DIAGNOSIS: Hemifacial spasm and blepharospasm.   POST-OPERATIVE DIAGNOSIS: same as above   BLOOD LOSS: minimal COMPLICATIONS: none

## 2018-10-10 ENCOUNTER — MED REC SCAN ONLY (OUTPATIENT)
Dept: NEUROLOGY | Facility: CLINIC | Age: 48
End: 2018-10-10

## 2018-10-12 ENCOUNTER — OFFICE VISIT (OUTPATIENT)
Dept: DERMATOLOGY CLINIC | Facility: CLINIC | Age: 48
End: 2018-10-12

## 2018-10-12 DIAGNOSIS — D23.4 BENIGN NEOPLASM OF SCALP AND SKIN OF NECK: ICD-10-CM

## 2018-10-12 DIAGNOSIS — L71.9 ROSACEA: ICD-10-CM

## 2018-10-12 DIAGNOSIS — D23.60 BENIGN NEOPLASM OF SKIN OF UPPER LIMB, INCLUDING SHOULDER, UNSPECIFIED LATERALITY: ICD-10-CM

## 2018-10-12 DIAGNOSIS — D23.70 BENIGN NEOPLASM OF SKIN OF LOWER LIMB, INCLUDING HIP, UNSPECIFIED LATERALITY: ICD-10-CM

## 2018-10-12 DIAGNOSIS — L82.1 SEBORRHEIC KERATOSES: ICD-10-CM

## 2018-10-12 DIAGNOSIS — D23.30 BENIGN NEOPLASM OF SKIN OF FACE: ICD-10-CM

## 2018-10-12 DIAGNOSIS — D22.9 MULTIPLE NEVI: ICD-10-CM

## 2018-10-12 DIAGNOSIS — D23.5 BENIGN NEOPLASM OF SKIN OF TRUNK, EXCEPT SCROTUM: ICD-10-CM

## 2018-10-12 DIAGNOSIS — Z85.828 HISTORY OF NONMELANOMA SKIN CANCER: ICD-10-CM

## 2018-10-12 DIAGNOSIS — D48.5 NEOPLASM OF UNCERTAIN BEHAVIOR OF SKIN: Primary | ICD-10-CM

## 2018-10-12 PROCEDURE — 11100 BIOPSY OF SKIN LESION: CPT | Performed by: DERMATOLOGY

## 2018-10-12 PROCEDURE — 99213 OFFICE O/P EST LOW 20 MIN: CPT | Performed by: DERMATOLOGY

## 2018-10-12 PROCEDURE — 88305 TISSUE EXAM BY PATHOLOGIST: CPT | Performed by: DERMATOLOGY

## 2018-10-12 PROCEDURE — 99212 OFFICE O/P EST SF 10 MIN: CPT | Performed by: DERMATOLOGY

## 2018-10-12 RX ORDER — GLYCOPYRROLATE 1 MG/1
1 TABLET ORAL 3 TIMES DAILY
Qty: 90 TABLET | Refills: 3 | Status: SHIPPED | OUTPATIENT
Start: 2018-10-12

## 2018-10-16 NOTE — PROGRESS NOTES
The pathology report from last visit showed -Benign keratosis, right medial lower leg   . Please log in test results, send biopsy results letter. Pt to rtc 1 year or prn.

## 2018-10-22 NOTE — PROGRESS NOTES
Kehinde Lopez is a 57year old female. HPI:     CC:  Patient presents with:  Lesion: LOV: 10-13-17. Pt presents today with lesions throughout body and requesting full body skin exam. Pt with personal hx of SCC, BCC.  Mother, Sister with hx of BCC, SC Besylate 5 MG Oral Tab Take 5 mg by mouth daily. Disp:  Rfl: 3   ibuprofen 600 MG Oral Tab Take 600 mg by mouth. Disp:  Rfl:    Venlafaxine HCl (EFFEXOR) 75 MG Oral Tab Take 75 mg by mouth daily.    Disp:  Rfl: 5     Allergies:     Azithromycin            R Concerns:         Service: Not Asked        Blood Transfusions: Not Asked        Caffeine Concern: Yes          Soda, Coffee, 3 cups; per NG        Occupational Exposure: Not Asked        Hobby Hazards: Not Asked        Sleep Concern: Not Asked face,nails, hair, external eyes, including conjunctival mucosa, eyelids, lips external ears , arms, digits,palms.      Multiple light to medium brown, well marginated, uniformly pigmented, macules and papules 6 mm and less scattered on exam. pigmented lesio discussed. Lesions treated with cryo- . Biopsy if not resolved. Rosacea stable  No other suspicious lesions  Please refer to map for specific lesions. See additional diagnoses. Pros cons of various therapies, risks benefits discussed. Pathophysiology

## 2018-10-22 NOTE — PROGRESS NOTES
Operative Report                     Shave Biopsy     Clinical diagnosis:    Size of lesion:    Location:Diagnosis/Clinical History: 1.2cm pink-red crusted plaque  Spec 1 Description >>>>>: Right medial lower leg  Spec 1 Comment: Rule out [address] [facility]

## 2018-11-16 ENCOUNTER — TELEPHONE (OUTPATIENT)
Dept: OTHER | Age: 48
End: 2018-11-16

## 2018-11-16 NOTE — TELEPHONE ENCOUNTER
Action Requested: Summary for Provider     []  Critical Lab, Recommendations Needed  [] Need Additional Advice  []   FYI    []   Need Orders  [] Need Medications Sent to Pharmacy  []  Other     SUMMARY: Pt going to [facility] for further evaluation rash on toes bi

## 2018-11-17 ENCOUNTER — HOSPITAL ENCOUNTER (OUTPATIENT)
Age: 48
Discharge: HOME | End: 2018-11-17
Attending: EMERGENCY MEDICINE
Payer: COMMERCIAL

## 2018-11-17 VITALS
RESPIRATION RATE: 14 BRPM | SYSTOLIC BLOOD PRESSURE: 126 MMHG | TEMPERATURE: 98 F | DIASTOLIC BLOOD PRESSURE: 83 MMHG | HEART RATE: 65 BPM | OXYGEN SATURATION: 100 %

## 2018-11-17 DIAGNOSIS — M79.89 PAIN AND SWELLING OF TOE OF LEFT FOOT: Primary | ICD-10-CM

## 2018-11-17 DIAGNOSIS — M79.675 PAIN AND SWELLING OF TOE OF LEFT FOOT: Primary | ICD-10-CM

## 2018-11-17 PROCEDURE — 99213 OFFICE O/P EST LOW 20 MIN: CPT

## 2018-11-17 PROCEDURE — 99214 OFFICE O/P EST MOD 30 MIN: CPT

## 2018-11-17 RX ORDER — PREDNISONE 20 MG/1
TABLET ORAL
Qty: 12 TABLET | Refills: 0 | Status: SHIPPED | OUTPATIENT
Start: 2018-11-17 | End: 2019-01-02 | Stop reason: ALTCHOICE

## 2018-11-17 NOTE — ED INITIAL ASSESSMENT (HPI)
C/o left foot 3rd and 5th toe pain and swelling, and itchiness. Patient states it happens every winter. Patient also states that she would like to ensure that her lungs are clear as she had a recent URI.

## 2018-11-17 NOTE — ED PROVIDER NOTES
Patient Seen in: [address]    History   Patient presents with:   Toe Pain    Stated Complaint: toe pain, slight chest pain    HPI    Patient is a 72-year-old female with past history of Raynaud's disease, depression who vital signs reviewed. All other systems reviewed and negative except as noted above.     Physical Exam     ED Triage Vitals [11/17/18 1206]   /83   Pulse 65   Resp 14   Temp 97.8 °F (36.6 °C)   Temp src Oral   SpO2 100 %   O2 Device None (Room ai tabs by mouth daily times 2 days, then 1 tab daily by mouth times 2 days.   Qty: 12 tablet Refills: 0

## 2018-12-04 ENCOUNTER — TELEPHONE (OUTPATIENT)
Dept: INTERNAL MEDICINE CLINIC | Facility: CLINIC | Age: 48
End: 2018-12-04

## 2018-12-04 NOTE — TELEPHONE ENCOUNTER
Pt was contacted and it was stated that she had blood work done for the tb test. Pt was informed that she had the quantiferin gold done in 01/18. Pt stated that she will come to the [facility] on Thursday and pick it up from the .

## 2018-12-04 NOTE — TELEPHONE ENCOUNTER
Pt states need a letter showing that her TB from last year was negative, please call when letter ready for  or can it be submitted via [employer].

## 2018-12-21 RX ORDER — OMEPRAZOLE 40 MG/1
CAPSULE, DELAYED RELEASE ORAL
Qty: 90 CAPSULE | Refills: 0 | Status: SHIPPED | OUTPATIENT
Start: 2018-12-21 | End: 2023-08-07

## 2019-01-02 ENCOUNTER — OFFICE VISIT (OUTPATIENT)
Dept: INTERNAL MEDICINE CLINIC | Facility: CLINIC | Age: 49
End: 2019-01-02
Payer: COMMERCIAL

## 2019-01-02 VITALS
HEIGHT: 69 IN | WEIGHT: 190.19 LBS | SYSTOLIC BLOOD PRESSURE: 133 MMHG | BODY MASS INDEX: 28.17 KG/M2 | RESPIRATION RATE: 18 BRPM | TEMPERATURE: 98 F | DIASTOLIC BLOOD PRESSURE: 80 MMHG | HEART RATE: 66 BPM

## 2019-01-02 DIAGNOSIS — Z12.31 SCREENING MAMMOGRAM, ENCOUNTER FOR: ICD-10-CM

## 2019-01-02 DIAGNOSIS — Z11.1 VISIT FOR TB SKIN TEST: ICD-10-CM

## 2019-01-02 DIAGNOSIS — K21.9 GASTROESOPHAGEAL REFLUX DISEASE WITHOUT ESOPHAGITIS: ICD-10-CM

## 2019-01-02 DIAGNOSIS — Z00.00 PHYSICAL EXAM, ROUTINE: ICD-10-CM

## 2019-01-02 DIAGNOSIS — E53.8 VITAMIN B12 DEFICIENCY: ICD-10-CM

## 2019-01-02 DIAGNOSIS — R79.0 LOW FERRITIN: ICD-10-CM

## 2019-01-02 DIAGNOSIS — E78.00 PURE HYPERCHOLESTEROLEMIA: Primary | ICD-10-CM

## 2019-01-02 PROCEDURE — 86580 TB INTRADERMAL TEST: CPT | Performed by: INTERNAL MEDICINE

## 2019-01-02 PROCEDURE — 99396 PREV VISIT EST AGE 40-64: CPT | Performed by: INTERNAL MEDICINE

## 2019-01-02 PROCEDURE — 99212 OFFICE O/P EST SF 10 MIN: CPT | Performed by: INTERNAL MEDICINE

## 2019-01-02 NOTE — PROGRESS NOTES
Pt was given the tb skin test in the right arm after pt was asked regarding recent tb skin test placement.  It was stated that she has not had any positive results in the past. Pt was informed that she need to return within 48-72 hours to have test read and

## 2019-01-02 NOTE — PROGRESS NOTES
HPI:    Patient ID: Jp Dempsey is a 57year old female.   Patient presents today for physical exam, states doing well otherwise, denies chest pain, shortness of breath, dyspnea on exertion or heart palpitations also denies nausea, vomiting, diarrhe Oral Tab Take 75 mg by mouth daily. Disp:  Rfl: 5     Allergies:  Azithromycin            RASH   PHYSICAL EXAM:   Physical Exam   Constitutional: She is oriented to person, place, and time. She appears well-developed and well-nourished. No distress.    HE food  · Eat more  fruits and vegetables   · Be active advised  walking /exercise as  tolerated  · Counseling on ideal weight/BMI  · Labs       2. Low ferritin  - FERRITIN; Future    3. Vitamin B12 deficiency  - VITAMIN B12; Future    4.  Screening mammogram

## 2019-01-04 ENCOUNTER — NURSE ONLY (OUTPATIENT)
Dept: FAMILY MEDICINE CLINIC | Facility: CLINIC | Age: 49
End: 2019-01-04
Payer: COMMERCIAL

## 2019-01-04 NOTE — PROGRESS NOTES
Pt returned today for tb skin test read of the right arm with negative results of 0 mm. Form was completed and given to the pt.

## 2019-03-12 ENCOUNTER — OFFICE VISIT (OUTPATIENT)
Dept: PODIATRY CLINIC | Facility: CLINIC | Age: 49
End: 2019-03-12
Payer: COMMERCIAL

## 2019-03-12 ENCOUNTER — HOSPITAL ENCOUNTER (OUTPATIENT)
Dept: GENERAL RADIOLOGY | Facility: HOSPITAL | Age: 49
Discharge: HOME | End: 2019-03-12
Attending: PODIATRIST
Payer: COMMERCIAL

## 2019-03-12 DIAGNOSIS — M79.89 SOFT TISSUE MASS: ICD-10-CM

## 2019-03-12 DIAGNOSIS — M79.89 SOFT TISSUE MASS: Primary | ICD-10-CM

## 2019-03-12 PROCEDURE — 99213 OFFICE O/P EST LOW 20 MIN: CPT | Performed by: PODIATRIST

## 2019-03-12 PROCEDURE — 73630 X-RAY EXAM OF FOOT: CPT | Performed by: PODIATRIST

## 2019-03-12 PROCEDURE — 99212 OFFICE O/P EST SF 10 MIN: CPT | Performed by: PODIATRIST

## 2019-03-12 RX ORDER — CEFADROXIL 500 MG/1
500 CAPSULE ORAL 2 TIMES DAILY
Qty: 20 CAPSULE | Refills: 0 | Status: SHIPPED | OUTPATIENT
Start: 2019-03-12 | End: 2019-04-01

## 2019-03-12 NOTE — PROGRESS NOTES
HPI:    Patient ID: Ananya Carrion is a 57year old female. 44-year-old female presents today having not been seen in over a year. She is aware of a dorsal swelling on the left foot that seems to have increased over the last few days.   Is been prese aspiration. After complete discussion I placed her on Duricef 500 mg twice per day for 10 days. I encouraged warm compresses or soaks with mild warm Epsom salt.   I encouraged that she limit her weightbearing activities and she will take ibuprofen 400 mg

## 2019-04-01 ENCOUNTER — TELEPHONE (OUTPATIENT)
Dept: ORTHOPEDICS CLINIC | Facility: CLINIC | Age: 49
End: 2019-04-01

## 2019-04-01 ENCOUNTER — OFFICE VISIT (OUTPATIENT)
Dept: PODIATRY CLINIC | Facility: CLINIC | Age: 49
End: 2019-04-01
Payer: COMMERCIAL

## 2019-04-01 DIAGNOSIS — M79.89 SOFT TISSUE MASS: Primary | ICD-10-CM

## 2019-04-01 PROCEDURE — 99212 OFFICE O/P EST SF 10 MIN: CPT | Performed by: PODIATRIST

## 2019-04-01 PROCEDURE — 99213 OFFICE O/P EST LOW 20 MIN: CPT | Performed by: PODIATRIST

## 2019-04-01 RX ORDER — ESOMEPRAZOLE MAGNESIUM 40 MG/1
CAPSULE, DELAYED RELEASE ORAL
COMMUNITY
Start: 2017-12-04

## 2019-04-01 NOTE — PROGRESS NOTES
HPI:    Patient ID: Joleen Teran is a 57year old female. This 31-year-old female presents for follow-up in reference to the swelling and soft tissue mass on the dorsal aspect of the left foot and ankle.   Patient states that the swelling is gone aw (primary encounter diagnosis)    No orders of the defined types were placed in this encounter.       Meds This Visit:  Requested Prescriptions      No prescriptions requested or ordered in this encounter       Imaging & Referrals:  None       #[id number]

## 2019-04-02 NOTE — TELEPHONE ENCOUNTER
Called and spoke to pt surgery is scheduled at [facility] on 04/17/19. Pt's PO appointments with  are scheduled on 04/24 and 05/01. Pt informed sx center will call the day before surgery with a time to arrive and all other instructions.   All questions an

## 2019-04-09 ENCOUNTER — TELEPHONE (OUTPATIENT)
Dept: PODIATRY CLINIC | Facility: CLINIC | Age: 49
End: 2019-04-09

## 2019-04-09 NOTE — TELEPHONE ENCOUNTER
Pt states she is feeling better and would like to cancel sx scheduled for 4/17. May consider rescheduling later.

## 2020-07-13 ENCOUNTER — OFFICE VISIT (OUTPATIENT)
Dept: INTERNAL MEDICINE CLINIC | Facility: CLINIC | Age: 50
End: 2020-07-13
Payer: COMMERCIAL

## 2020-07-13 VITALS
RESPIRATION RATE: 18 BRPM | TEMPERATURE: 98 F | BODY MASS INDEX: 28.58 KG/M2 | WEIGHT: 193 LBS | SYSTOLIC BLOOD PRESSURE: 117 MMHG | HEIGHT: 69 IN | HEART RATE: 63 BPM | DIASTOLIC BLOOD PRESSURE: 77 MMHG

## 2020-07-13 DIAGNOSIS — Z00.00 PHYSICAL EXAM: Primary | ICD-10-CM

## 2020-07-13 DIAGNOSIS — Z11.1 SCREENING-PULMONARY TB: ICD-10-CM

## 2020-07-13 DIAGNOSIS — Z12.31 SCREENING MAMMOGRAM, ENCOUNTER FOR: ICD-10-CM

## 2020-07-13 PROBLEM — J30.9 ALLERGIC RHINITIS: Status: RESOLVED | Noted: 2017-03-22 | Resolved: 2020-07-13

## 2020-07-13 PROCEDURE — 99396 PREV VISIT EST AGE 40-64: CPT | Performed by: INTERNAL MEDICINE

## 2020-07-13 NOTE — PROGRESS NOTES
HPI:    Patient ID: Alberto Enriquez is a 52year old female.   Patient presents with:  Employment Physical: work phsycial       Patient presents today for school  physical exam, states doing well otherwise, denies any covid 23 sy  aslo  denies chest jorge She appears well-developed and well-nourished. No distress. HENT:   Head: Normocephalic and atraumatic.    Right Ear: Tympanic membrane, external ear and ear canal normal.   Left Ear: Tympanic membrane, external ear and ear canal normal.   Nose: Nose norm Complete labs as ordered,   Preventative health maintenance tests reviewed   Mammogram , need pap smear   Immunizations reviewed  -discussed   Need quantiferon test or ppd last one 12/19  Patient verbalized understanding and compliance      physical exam

## 2020-07-15 ENCOUNTER — LAB ENCOUNTER (OUTPATIENT)
Dept: LAB | Facility: HOSPITAL | Age: 50
End: 2020-07-15
Attending: INTERNAL MEDICINE
Payer: COMMERCIAL

## 2020-07-15 DIAGNOSIS — Z11.1 SCREENING-PULMONARY TB: ICD-10-CM

## 2020-07-15 DIAGNOSIS — Z00.00 PHYSICAL EXAM: ICD-10-CM

## 2020-07-15 LAB
ALBUMIN SERPL-MCNC: 3.9 G/DL (ref 3.4–5)
ALBUMIN/GLOB SERPL: 1.3 {RATIO} (ref 1–2)
ALP LIVER SERPL-CCNC: 52 U/L (ref 39–100)
ALT SERPL-CCNC: 40 U/L (ref 13–56)
ANION GAP SERPL CALC-SCNC: 6 MMOL/L (ref 0–18)
AST SERPL-CCNC: 25 U/L (ref 15–37)
BASOPHILS # BLD AUTO: 0.08 X10(3) UL (ref 0–0.2)
BASOPHILS NFR BLD AUTO: 1.2 %
BILIRUB SERPL-MCNC: 0.4 MG/DL (ref 0.1–2)
BILIRUB UR QL: NEGATIVE
BUN BLD-MCNC: 14 MG/DL (ref 7–18)
BUN/CREAT SERPL: 17.7 (ref 10–20)
CALCIUM BLD-MCNC: 8.8 MG/DL (ref 8.5–10.1)
CHLORIDE SERPL-SCNC: 107 MMOL/L (ref 98–112)
CHOLEST SMN-MCNC: 252 MG/DL (ref ?–200)
CO2 SERPL-SCNC: 26 MMOL/L (ref 21–32)
COLOR UR: YELLOW
CREAT BLD-MCNC: 0.79 MG/DL (ref 0.55–1.02)
DEPRECATED RDW RBC AUTO: 43.1 FL (ref 35.1–46.3)
EOSINOPHIL # BLD AUTO: 0.11 X10(3) UL (ref 0–0.7)
EOSINOPHIL NFR BLD AUTO: 1.6 %
ERYTHROCYTE [DISTWIDTH] IN BLOOD BY AUTOMATED COUNT: 12.4 % (ref 11–15)
GLOBULIN PLAS-MCNC: 3.1 G/DL (ref 2.8–4.4)
GLUCOSE BLD-MCNC: 86 MG/DL (ref 70–99)
GLUCOSE UR-MCNC: NEGATIVE MG/DL
HCT VFR BLD AUTO: 40.9 % (ref 35–48)
HDLC SERPL-MCNC: 81 MG/DL (ref 40–59)
HGB BLD-MCNC: 13.3 G/DL (ref 12–16)
IMM GRANULOCYTES # BLD AUTO: 0.01 X10(3) UL (ref 0–1)
IMM GRANULOCYTES NFR BLD: 0.1 %
KETONES UR-MCNC: NEGATIVE MG/DL
LDLC SERPL CALC-MCNC: 154 MG/DL (ref ?–100)
LEUKOCYTE ESTERASE UR QL STRIP.AUTO: NEGATIVE
LYMPHOCYTES # BLD AUTO: 1.57 X10(3) UL (ref 1–4)
LYMPHOCYTES NFR BLD AUTO: 23.1 %
M PROTEIN MFR SERPL ELPH: 7 G/DL (ref 6.4–8.2)
MCH RBC QN AUTO: 30.9 PG (ref 26–34)
MCHC RBC AUTO-ENTMCNC: 32.5 G/DL (ref 31–37)
MCV RBC AUTO: 95.1 FL (ref 80–100)
MONOCYTES # BLD AUTO: 0.45 X10(3) UL (ref 0.1–1)
MONOCYTES NFR BLD AUTO: 6.6 %
NEUTROPHILS # BLD AUTO: 4.58 X10 (3) UL (ref 1.5–7.7)
NEUTROPHILS # BLD AUTO: 4.58 X10(3) UL (ref 1.5–7.7)
NEUTROPHILS NFR BLD AUTO: 67.4 %
NITRITE UR QL STRIP.AUTO: NEGATIVE
NONHDLC SERPL-MCNC: 171 MG/DL (ref ?–130)
OSMOLALITY SERPL CALC.SUM OF ELEC: 288 MOSM/KG (ref 275–295)
PATIENT FASTING Y/N/NP: YES
PATIENT FASTING Y/N/NP: YES
PH UR: 6 [PH] (ref 5–8)
PLATELET # BLD AUTO: 209 10(3)UL (ref 150–450)
POTASSIUM SERPL-SCNC: 3.7 MMOL/L (ref 3.5–5.1)
PROT UR-MCNC: NEGATIVE MG/DL
RBC # BLD AUTO: 4.3 X10(6)UL (ref 3.8–5.3)
RBC #/AREA URNS AUTO: 1 /HPF
SODIUM SERPL-SCNC: 139 MMOL/L (ref 136–145)
SP GR UR STRIP: 1.01 (ref 1–1.03)
TRIGL SERPL-MCNC: 86 MG/DL (ref 30–149)
TSI SER-ACNC: 0.93 MIU/ML (ref 0.36–3.74)
UROBILINOGEN UR STRIP-ACNC: <2
VLDLC SERPL CALC-MCNC: 17 MG/DL (ref 0–30)
WBC # BLD AUTO: 6.8 X10(3) UL (ref 4–11)
WBC #/AREA URNS AUTO: 1 /HPF

## 2020-07-15 PROCEDURE — 85025 COMPLETE CBC W/AUTO DIFF WBC: CPT

## 2020-07-15 PROCEDURE — 86480 TB TEST CELL IMMUN MEASURE: CPT

## 2020-07-15 PROCEDURE — 84443 ASSAY THYROID STIM HORMONE: CPT

## 2020-07-15 PROCEDURE — 81001 URINALYSIS AUTO W/SCOPE: CPT | Performed by: INTERNAL MEDICINE

## 2020-07-15 PROCEDURE — 80061 LIPID PANEL: CPT

## 2020-07-15 PROCEDURE — 80053 COMPREHEN METABOLIC PANEL: CPT

## 2020-07-15 PROCEDURE — 36415 COLL VENOUS BLD VENIPUNCTURE: CPT

## 2020-07-17 LAB
M TB IFN-G CD4+ T-CELLS BLD-ACNC: 0.02 IU/ML
M TB TUBERC IFN-G BLD QL: NEGATIVE
M TB TUBERC IGNF/MITOGEN IGNF CONTROL: 7.87 IU/ML
QUANTIFERON TB1 MINUS NIL: -0.01 IU/ML
QUANTIFERON TB2 MINUS NIL: 0 IU/ML

## 2020-07-20 ENCOUNTER — LAB ENCOUNTER (OUTPATIENT)
Dept: LAB | Facility: HOSPITAL | Age: 50
End: 2020-07-20
Attending: INTERNAL MEDICINE
Payer: COMMERCIAL

## 2020-07-20 ENCOUNTER — OFFICE VISIT (OUTPATIENT)
Dept: INTERNAL MEDICINE CLINIC | Facility: CLINIC | Age: 50
End: 2020-07-20
Payer: COMMERCIAL

## 2020-07-20 DIAGNOSIS — Z20.822 EXPOSURE TO COVID-19 VIRUS: ICD-10-CM

## 2020-07-20 DIAGNOSIS — B34.9 VIRAL SYNDROME: Primary | ICD-10-CM

## 2020-07-20 DIAGNOSIS — B34.9 VIRAL SYNDROME: ICD-10-CM

## 2020-07-20 PROCEDURE — 99214 OFFICE O/P EST MOD 30 MIN: CPT | Performed by: INTERNAL MEDICINE

## 2020-07-20 NOTE — PROGRESS NOTES
Telemedicine Note    Virtual/Telephone Check-In    Isabella Apple verbally consents to a Virtual/Telephone Check-In service on 07/20/20.  Patient understands and accepts financial responsibility for any deductible, co-insurance and/or co-pays associate Past Medical History:   Diagnosis Date   • Anxiety    • Basal cell carcinoma of neck 2012    Excision   • Depression    • History of abnormal Pap smear 2012    LEEP procedure; per NG   • History of miscarriage 2008    D&C; per NG   • Lipid screening 8/ Reported on 7/13/2020 ) 90 tablet 3   • AmLODIPine Besylate 5 MG Oral Tab Take 5 mg by mouth daily. 3   • ibuprofen 600 MG Oral Tab Take 600 mg by mouth. • Venlafaxine HCl (EFFEXOR) 75 MG Oral Tab Take 75 mg by mouth daily.     5       Azithromycin Recommend self-quarantine  -to stay isolated in the room  patient to stay home -self   Quarantine/   room  avoid any close contacts -including her family members  avoid  gatherings with people at this time   keep the distance at least 6 feet with other

## 2020-07-23 LAB — SARS-COV-2 BY PCR: NOT DETECTED

## 2020-07-24 ENCOUNTER — TELEPHONE (OUTPATIENT)
Dept: INTERNAL MEDICINE CLINIC | Facility: CLINIC | Age: 50
End: 2020-07-24

## 2020-07-24 NOTE — TELEPHONE ENCOUNTER
Patient called for her COVID19 test results. Collected on Monday. She has been quarantining at home per instructions from Dr. Dee Dee Pena. Test results now available as of today but not yet reviewed by Dr. Dee Dee Pena.      Please advise if OK to review res

## 2020-07-24 NOTE — TELEPHONE ENCOUNTER
covid  19 - negative     Recommend F/u visit in few   Days   -    Continue mask and  cdc   Guidelines     If  Any  Fever  Need  To  stay home .

## 2020-07-24 NOTE — TELEPHONE ENCOUNTER
Attempted to contact patient with results below. Left message to call back. Results released to [address]

## 2020-07-25 NOTE — TELEPHONE ENCOUNTER
Patient viewed test result through [address] on 7/25/20.     Viewed by Luis Felipe Christine on 7/25/2020  8:43 AM   Written by Yamini Valadez MD on 7/25/2020  7:09 AM

## 2020-08-01 ENCOUNTER — HOSPITAL ENCOUNTER (OUTPATIENT)
Dept: MAMMOGRAPHY | Facility: HOSPITAL | Age: 50
Discharge: HOME | End: 2020-08-01
Attending: INTERNAL MEDICINE
Payer: COMMERCIAL

## 2020-08-01 DIAGNOSIS — Z12.31 SCREENING MAMMOGRAM, ENCOUNTER FOR: ICD-10-CM

## 2020-08-01 PROCEDURE — 77063 BREAST TOMOSYNTHESIS BI: CPT | Performed by: INTERNAL MEDICINE

## 2020-08-01 PROCEDURE — 77067 SCR MAMMO BI INCL CAD: CPT | Performed by: INTERNAL MEDICINE

## 2021-05-17 ENCOUNTER — OFFICE VISIT (OUTPATIENT)
Dept: OBGYN CLINIC | Facility: CLINIC | Age: 51
End: 2021-05-17
Payer: COMMERCIAL

## 2021-05-17 ENCOUNTER — TELEPHONE (OUTPATIENT)
Dept: OBGYN CLINIC | Facility: CLINIC | Age: 51
End: 2021-05-17

## 2021-05-17 ENCOUNTER — LAB ENCOUNTER (OUTPATIENT)
Dept: LAB | Facility: HOSPITAL | Age: 51
End: 2021-05-17
Attending: OBSTETRICS & GYNECOLOGY
Payer: COMMERCIAL

## 2021-05-17 VITALS
HEART RATE: 75 BPM | DIASTOLIC BLOOD PRESSURE: 89 MMHG | SYSTOLIC BLOOD PRESSURE: 133 MMHG | BODY MASS INDEX: 28 KG/M2 | WEIGHT: 191 LBS

## 2021-05-17 DIAGNOSIS — N93.9 ABNORMAL UTERINE BLEEDING (AUB): Primary | ICD-10-CM

## 2021-05-17 DIAGNOSIS — N93.9 ABNORMAL UTERINE BLEEDING (AUB): ICD-10-CM

## 2021-05-17 PROCEDURE — 3075F SYST BP GE 130 - 139MM HG: CPT | Performed by: OBSTETRICS & GYNECOLOGY

## 2021-05-17 PROCEDURE — 99203 OFFICE O/P NEW LOW 30 MIN: CPT | Performed by: OBSTETRICS & GYNECOLOGY

## 2021-05-17 PROCEDURE — 36415 COLL VENOUS BLD VENIPUNCTURE: CPT

## 2021-05-17 PROCEDURE — 85025 COMPLETE CBC W/AUTO DIFF WBC: CPT

## 2021-05-17 PROCEDURE — 84443 ASSAY THYROID STIM HORMONE: CPT

## 2021-05-17 PROCEDURE — 3079F DIAST BP 80-89 MM HG: CPT | Performed by: OBSTETRICS & GYNECOLOGY

## 2021-05-27 ENCOUNTER — TELEPHONE (OUTPATIENT)
Dept: OBGYN CLINIC | Facility: CLINIC | Age: 51
End: 2021-05-27

## 2021-05-27 DIAGNOSIS — Z80.41 FAMILY HISTORY OF OVARIAN CANCER: Primary | ICD-10-CM

## 2021-05-27 NOTE — H&P
HPI:  The patient is a 53 yo F here to discuss AUB. Pt reports since Oct experiencing 28d cycles with 3 weeks of bleeding.  -4 days very heavy and passing dime sized clots. +IC with 1 partner. NO PCB. Family hx of ovarian cancer.   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  Patient is seeing counsler and speaks to sponsor daily      Drug use: Yes        Types: benzodiazepines Intimate Partner Violence:       Fear of Current or Ex-Partner:       Emotionally Abused:       Physically Abused:       Sexually Abused:     FAMILY HISTORY:  Family History   Problem Relation Age of Onset   • Ovarian Cancer Mother 36        per NG   • C normocephalic  Neck/Thyroid: thyroid symmetric, no thyromegaly, no nodules, no adenopathy  Heart: Regular rate and rhythm   Lungs: clear to ascultation bilaterally   Lymphatic:no abnormal supraclavicular or axillary adenopathy is noted  Breast: normal with

## 2021-06-02 ENCOUNTER — HOSPITAL ENCOUNTER (OUTPATIENT)
Dept: ULTRASOUND IMAGING | Facility: HOSPITAL | Age: 51
Discharge: HOME | End: 2021-06-02
Attending: OBSTETRICS & GYNECOLOGY
Payer: COMMERCIAL

## 2021-06-02 DIAGNOSIS — N93.9 ABNORMAL UTERINE BLEEDING (AUB): ICD-10-CM

## 2021-06-02 PROCEDURE — 76856 US EXAM PELVIC COMPLETE: CPT | Performed by: OBSTETRICS & GYNECOLOGY

## 2021-06-02 PROCEDURE — 76830 TRANSVAGINAL US NON-OB: CPT | Performed by: OBSTETRICS & GYNECOLOGY

## 2021-06-21 ENCOUNTER — TELEPHONE (OUTPATIENT)
Dept: OBGYN CLINIC | Facility: CLINIC | Age: 51
End: 2021-06-21

## 2021-06-21 NOTE — TELEPHONE ENCOUNTER
----- Message from Krysten Mejia DO sent at 6/21/2021  1:43 PM CDT -----  Pt needs emb and endocervical polyp removal in office. Cytotec the night before. Motrin as well.   UPT day of

## 2021-07-06 ENCOUNTER — TELEPHONE (OUTPATIENT)
Dept: GENETICS | Facility: HOSPITAL | Age: 51
End: 2021-07-06

## 2021-07-17 NOTE — TELEPHONE ENCOUNTER
Pt informed of recs for embx and polyp removal.  Appt made on 8/20. Pt advised to take cytotec the night before and 600 mg of ibuprofen 30-60 minutes before the appt. Pt advised to call if she starts her period before appt. Pharmacy verified.  Message to

## 2021-07-19 RX ORDER — MISOPROSTOL 200 UG/1
200 TABLET ORAL
Qty: 1 TABLET | Refills: 0 | Status: SHIPPED | OUTPATIENT
Start: 2021-07-19 | End: 2021-07-20

## 2021-08-02 ENCOUNTER — APPOINTMENT (OUTPATIENT)
Dept: GENETICS | Facility: HOSPITAL | Age: 51
End: 2021-08-02
Attending: GENETIC COUNSELOR, MS
Payer: COMMERCIAL

## 2021-08-11 ENCOUNTER — NURSE ONLY (OUTPATIENT)
Dept: HEMATOLOGY/ONCOLOGY | Facility: HOSPITAL | Age: 51
End: 2021-08-11
Attending: GENETIC COUNSELOR, MS
Payer: COMMERCIAL

## 2021-08-11 ENCOUNTER — GENETICS ENCOUNTER (OUTPATIENT)
Dept: GENETICS | Facility: HOSPITAL | Age: 51
End: 2021-08-11
Attending: GENETIC COUNSELOR, MS
Payer: COMMERCIAL

## 2021-08-11 DIAGNOSIS — Z80.41 FAMILY HISTORY OF MALIGNANT NEOPLASM OF OVARY: Primary | ICD-10-CM

## 2021-08-11 PROCEDURE — 36415 COLL VENOUS BLD VENIPUNCTURE: CPT

## 2021-08-11 PROCEDURE — 96040 HC GENETIC COUNSELING EA 30 MIN: CPT | Performed by: GENETIC COUNSELOR, MS

## 2021-08-11 NOTE — PROGRESS NOTES
Reason for visit: Mrs. Ketty Apple is a 24-year-old woman who was referred for genetic counseling due to her family history of ovarian cancer and other malignancies.   She was seen for genetic counseling to discuss the option of genetic testing given her family fibroids, thyroid issues or current dermatological concerns.     Other medical history of note: Mrs. Alphonso Stephens reports age at first period at 17 years, she was 30 at the birth of her elder daughter, and she is currently pre-menopausal.  Mrs. Alphonso Stephens denies any cases of hereditary breast/ovarian cancer. Mutations in other genes have also been associated with an increased risk for breast cancer - but mutations in these other genes are statistically less often seen in hereditary breast cancer.     We discussed the Given her mother with a diagnosis of ovarian cancer, she does meet NCCN criteria for genetic testing.   After discussing the multiple testing options, Mrs. Merlinda Zeyad decided that she would like to pursue molecular genetic testing for a comprehensive cancer p

## 2021-08-18 ENCOUNTER — TELEPHONE (OUTPATIENT)
Dept: GENETICS | Facility: HOSPITAL | Age: 51
End: 2021-08-18

## 2021-08-18 NOTE — TELEPHONE ENCOUNTER
Spoke to [place] to share genetic testing results, which were performed for 84 gene panel through [facility]). Only finding is a VUS in NBN (c.2246A>T) which we reviewed does not change her clinical management.  Given unknown etiolo

## 2021-09-21 ENCOUNTER — OFFICE VISIT (OUTPATIENT)
Dept: OBGYN CLINIC | Facility: CLINIC | Age: 51
End: 2021-09-21
Payer: COMMERCIAL

## 2021-09-21 VITALS
HEART RATE: 78 BPM | BODY MASS INDEX: 28 KG/M2 | WEIGHT: 186.38 LBS | SYSTOLIC BLOOD PRESSURE: 123 MMHG | DIASTOLIC BLOOD PRESSURE: 72 MMHG

## 2021-09-21 DIAGNOSIS — N93.9 ABNORMAL UTERINE BLEEDING: Primary | ICD-10-CM

## 2021-09-21 DIAGNOSIS — N84.1 CERVICAL POLYP: ICD-10-CM

## 2021-09-21 DIAGNOSIS — Z12.31 SCREENING MAMMOGRAM, ENCOUNTER FOR: ICD-10-CM

## 2021-09-21 DIAGNOSIS — N84.0 POLYP OF CORPUS UTERI: ICD-10-CM

## 2021-09-21 PROCEDURE — 58100 BIOPSY OF UTERUS LINING: CPT | Performed by: OBSTETRICS & GYNECOLOGY

## 2021-09-21 PROCEDURE — 3074F SYST BP LT 130 MM HG: CPT | Performed by: OBSTETRICS & GYNECOLOGY

## 2021-09-21 PROCEDURE — 3078F DIAST BP <80 MM HG: CPT | Performed by: OBSTETRICS & GYNECOLOGY

## 2021-10-08 ENCOUNTER — TELEPHONE (OUTPATIENT)
Dept: OBGYN CLINIC | Facility: CLINIC | Age: 51
End: 2021-10-08

## 2021-10-19 NOTE — TELEPHONE ENCOUNTER
Spoke to pt. Reviewed path. Last menses 4 days and light. Significant improvement in flow length and volume. Discussed prolapsing endometrial polyp on path. Due to removal by spinning off in the office, question of complete removal or not.   We reviewed

## 2022-02-16 ENCOUNTER — APPOINTMENT (OUTPATIENT)
Dept: URBAN - METROPOLITAN AREA CLINIC 321 | Age: 52
Setting detail: DERMATOLOGY
End: 2022-02-16

## 2022-02-16 DIAGNOSIS — B07.8 OTHER VIRAL WARTS: ICD-10-CM

## 2022-02-16 DIAGNOSIS — L84 CORNS AND CALLOSITIES: ICD-10-CM

## 2022-02-16 PROCEDURE — OTHER LIQUID NITROGEN: OTHER

## 2022-02-16 PROCEDURE — 17110 DESTRUCT B9 LESION 1-14: CPT

## 2022-02-16 PROCEDURE — 99212 OFFICE O/P EST SF 10 MIN: CPT | Mod: 25

## 2022-02-16 PROCEDURE — OTHER ADDITIONAL NOTES: OTHER

## 2022-02-16 PROCEDURE — OTHER COUNSELING: OTHER

## 2022-02-16 ASSESSMENT — LOCATION SIMPLE DESCRIPTION DERM
LOCATION SIMPLE: RIGHT CHEEK
LOCATION SIMPLE: LEFT CHEEK
LOCATION SIMPLE: RIGHT 2ND TOE

## 2022-02-16 ASSESSMENT — LOCATION DETAILED DESCRIPTION DERM
LOCATION DETAILED: RIGHT MEDIAL 2ND TOE
LOCATION DETAILED: LEFT INFERIOR LATERAL MALAR CHEEK
LOCATION DETAILED: RIGHT SUPERIOR PREAURICULAR CHEEK

## 2022-02-16 ASSESSMENT — LOCATION ZONE DERM
LOCATION ZONE: FACE
LOCATION ZONE: TOE

## 2022-02-16 NOTE — PROCEDURE: LIQUID NITROGEN
Medical Necessity Clause: This procedure was medically necessary because the lesions that were treated were:
Include Z78.9 (Other Specified Conditions Influencing Health Status) As An Associated Diagnosis?: No
Duration Of Freeze Thaw-Cycle (Seconds): 0
Spray Paint Text: The liquid nitrogen was applied to the skin utilizing a spray paint frosting technique.
Total Number Of Lesions Treated: 2
Consent: The patient's consent was obtained including but not limited to risks of crusting, scabbing, blistering, scarring, darker or lighter pigmentary change, recurrence, incomplete removal and infection.
Show Spray Paint Technique Variable?: Yes
Detail Level: Simple
Post-Care Instructions: I reviewed with the patient in detail post-care instructions. Patient is to wear sunprotection, and avoid picking at any of the treated lesions. Pt may apply Vaseline to crusted or scabbing areas.
Medical Necessity Information: It is in your best interest to select a reason for this procedure from the list below. All of these items fulfill various CMS LCD requirements except the new and changing color options.

## 2022-02-16 NOTE — PROCEDURE: ADDITIONAL NOTES
Render Risk Assessment In Note?: no
Additional Notes: Advised pt to f/u with podiatry for further evaluation/ management.
Additional Notes: Pt has had biopsies done by two different dermatologists on R cheek. Pathology results showed verruca vulgaris. Discussed taking another biopsy, pt defers. Will treat with liquid nitrogen.
Detail Level: Simple

## 2022-02-17 ENCOUNTER — MED REC SCAN ONLY (OUTPATIENT)
Dept: INTERNAL MEDICINE CLINIC | Facility: CLINIC | Age: 52
End: 2022-02-17

## 2022-03-30 ENCOUNTER — APPOINTMENT (OUTPATIENT)
Dept: URBAN - METROPOLITAN AREA CLINIC 244 | Age: 52
Setting detail: DERMATOLOGY
End: 2022-03-30

## 2022-03-30 DIAGNOSIS — F42.4 EXCORIATION (SKIN-PICKING) DISORDER: ICD-10-CM

## 2022-03-30 DIAGNOSIS — B07.8 OTHER VIRAL WARTS: ICD-10-CM

## 2022-03-30 DIAGNOSIS — L57.0 ACTINIC KERATOSIS: ICD-10-CM

## 2022-03-30 PROBLEM — L98.1 FACTITIAL DERMATITIS: Status: ACTIVE | Noted: 2022-03-30

## 2022-03-30 PROCEDURE — 17003 DESTRUCT PREMALG LES 2-14: CPT | Mod: 59

## 2022-03-30 PROCEDURE — OTHER DEFER: OTHER

## 2022-03-30 PROCEDURE — 99213 OFFICE O/P EST LOW 20 MIN: CPT | Mod: 25

## 2022-03-30 PROCEDURE — OTHER LIQUID NITROGEN: OTHER

## 2022-03-30 PROCEDURE — OTHER COUNSELING: OTHER

## 2022-03-30 PROCEDURE — 17000 DESTRUCT PREMALG LESION: CPT | Mod: 59

## 2022-03-30 PROCEDURE — 17110 DESTRUCT B9 LESION 1-14: CPT

## 2022-03-30 PROCEDURE — OTHER ADDITIONAL NOTES: OTHER

## 2022-03-30 ASSESSMENT — LOCATION DETAILED DESCRIPTION DERM
LOCATION DETAILED: RIGHT SUPERIOR PREAURICULAR CHEEK
LOCATION DETAILED: RIGHT INFERIOR LATERAL MALAR CHEEK
LOCATION DETAILED: RIGHT SUPERIOR CENTRAL BUCCAL CHEEK
LOCATION DETAILED: LEFT INFERIOR CENTRAL MALAR CHEEK
LOCATION DETAILED: LEFT INFERIOR LATERAL MALAR CHEEK
LOCATION DETAILED: INFERIOR MID FOREHEAD
LOCATION DETAILED: LEFT SUPERIOR ANTERIOR NECK
LOCATION DETAILED: RIGHT CENTRAL LATERAL NECK

## 2022-03-30 ASSESSMENT — LOCATION SIMPLE DESCRIPTION DERM
LOCATION SIMPLE: LEFT ANTERIOR NECK
LOCATION SIMPLE: NECK
LOCATION SIMPLE: RIGHT CHEEK
LOCATION SIMPLE: INFERIOR FOREHEAD
LOCATION SIMPLE: LEFT CHEEK

## 2022-03-30 ASSESSMENT — LOCATION ZONE DERM
LOCATION ZONE: FACE
LOCATION ZONE: NECK
LOCATION ZONE: NECK

## 2022-03-30 NOTE — PROCEDURE: LIQUID NITROGEN
Total Number Of Aks Treated: 7
Spray Paint Text: The liquid nitrogen was applied to the skin utilizing a spray paint frosting technique.
Spray Paint Technique: No
Duration Of Freeze Thaw-Cycle (Seconds): 0
Consent: The patient's consent was obtained including but not limited to risks of crusting, scabbing, blistering, scarring, darker or lighter pigmentary change, recurrence, incomplete removal and infection.
Post-Care Instructions: I reviewed with the patient in detail post-care instructions. Patient is to wear sunprotection, and avoid picking at any of the treated lesions. Pt may apply Vaseline to crusted or scabbing areas.
Total Number Of Lesions Treated: 2
Render Post Care In The Note?: yes
Detail Level: Zone
Medical Necessity Clause: This procedure was medically necessary because the lesions that were treated were:
Medical Necessity Information: It is in your best interest to select a reason for this procedure from the list below. All of these items fulfill various CMS LCD requirements except the new and changing color options.
Detail Level: Simple

## 2022-03-30 NOTE — PROCEDURE: DEFER
Detail Level: Detailed
Introduction Text (Please End With A Colon): The following procedure was deferred:
Procedure To Be Performed At Next Visit: PDT Blue Light
Instructions (Optional): Pt defers for the moment will consider doing treatment in June, pt will call to schedule.

## 2022-03-30 NOTE — PROCEDURE: ADDITIONAL NOTES
Additional Notes: Recommended a OTC scar cream  and NAC 600mg BID. AE discussed
Render Risk Assessment In Note?: no
Detail Level: Simple

## 2022-04-01 NOTE — TELEPHONE ENCOUNTER
Pt states she had bilateral bunion surgery 3 years ago with Dr Romulo Avery and since then, every winter she has problems with nerve pain. Pt states currently it feels like \"I'm walking on glass\".  Pt states her toes are swollen to the point that it is hard to syncope

## 2022-04-05 ENCOUNTER — TELEPHONE (OUTPATIENT)
Dept: NEUROLOGY | Facility: CLINIC | Age: 52
End: 2022-04-05

## 2022-04-05 ENCOUNTER — OFFICE VISIT (OUTPATIENT)
Dept: NEUROLOGY | Facility: CLINIC | Age: 52
End: 2022-04-05
Payer: COMMERCIAL

## 2022-04-05 VITALS
WEIGHT: 185 LBS | HEART RATE: 67 BPM | DIASTOLIC BLOOD PRESSURE: 80 MMHG | BODY MASS INDEX: 27.4 KG/M2 | HEIGHT: 69 IN | SYSTOLIC BLOOD PRESSURE: 110 MMHG

## 2022-04-05 DIAGNOSIS — G51.31 HEMIFACIAL SPASM OF RIGHT SIDE OF FACE: Primary | ICD-10-CM

## 2022-04-05 PROCEDURE — 3008F BODY MASS INDEX DOCD: CPT | Performed by: OTHER

## 2022-04-05 PROCEDURE — 3079F DIAST BP 80-89 MM HG: CPT | Performed by: OTHER

## 2022-04-05 PROCEDURE — 3074F SYST BP LT 130 MM HG: CPT | Performed by: OTHER

## 2022-04-05 PROCEDURE — 99203 OFFICE O/P NEW LOW 30 MIN: CPT | Performed by: OTHER

## 2022-04-05 NOTE — TELEPHONE ENCOUNTER
Holly [email or web address] BS Pre certification initiated request for Botox 100 units cpt codes ,  [id number]. Dx: G51.31. PA# [id number].  Faxed clinical notes pending approval.

## 2022-04-11 NOTE — TELEPHONE ENCOUNTER
Meli from [place] called to say BOTOX has been denied. She will also fax this information. Options are appeal or peer to peer.   Peer to peer number is [phone]  Reference #[id number]

## 2022-05-02 ENCOUNTER — MED REC SCAN ONLY (OUTPATIENT)
Dept: INTERNAL MEDICINE CLINIC | Facility: CLINIC | Age: 52
End: 2022-05-02

## 2022-08-01 ENCOUNTER — APPOINTMENT (OUTPATIENT)
Dept: URBAN - METROPOLITAN AREA CLINIC 244 | Age: 52
Setting detail: DERMATOLOGY
End: 2022-08-01

## 2022-08-01 DIAGNOSIS — Z85.828 PERSONAL HISTORY OF OTHER MALIGNANT NEOPLASM OF SKIN: ICD-10-CM

## 2022-08-01 DIAGNOSIS — D22 MELANOCYTIC NEVI: ICD-10-CM

## 2022-08-01 DIAGNOSIS — L81.4 OTHER MELANIN HYPERPIGMENTATION: ICD-10-CM

## 2022-08-01 DIAGNOSIS — L57.0 ACTINIC KERATOSIS: ICD-10-CM

## 2022-08-01 DIAGNOSIS — F42.4 EXCORIATION (SKIN-PICKING) DISORDER: ICD-10-CM

## 2022-08-01 DIAGNOSIS — L82.1 OTHER SEBORRHEIC KERATOSIS: ICD-10-CM

## 2022-08-01 PROBLEM — L98.1 FACTITIAL DERMATITIS: Status: ACTIVE | Noted: 2022-08-01

## 2022-08-01 PROBLEM — D22.5 MELANOCYTIC NEVI OF TRUNK: Status: ACTIVE | Noted: 2022-08-01

## 2022-08-01 PROCEDURE — 17003 DESTRUCT PREMALG LES 2-14: CPT

## 2022-08-01 PROCEDURE — OTHER COUNSELING: OTHER

## 2022-08-01 PROCEDURE — 99213 OFFICE O/P EST LOW 20 MIN: CPT | Mod: 25

## 2022-08-01 PROCEDURE — OTHER ADDITIONAL NOTES: OTHER

## 2022-08-01 PROCEDURE — 17000 DESTRUCT PREMALG LESION: CPT

## 2022-08-01 PROCEDURE — OTHER LIQUID NITROGEN: OTHER

## 2022-08-01 ASSESSMENT — LOCATION SIMPLE DESCRIPTION DERM
LOCATION SIMPLE: LEFT UPPER BACK
LOCATION SIMPLE: RIGHT CHEEK
LOCATION SIMPLE: RIGHT ANTERIOR NECK
LOCATION SIMPLE: RIGHT UPPER BACK
LOCATION SIMPLE: RIGHT FOREHEAD
LOCATION SIMPLE: CHEST
LOCATION SIMPLE: LEFT CHEEK
LOCATION SIMPLE: SUPERIOR FOREHEAD
LOCATION SIMPLE: LEFT FOREHEAD
LOCATION SIMPLE: LEFT ANTERIOR NECK

## 2022-08-01 ASSESSMENT — LOCATION DETAILED DESCRIPTION DERM
LOCATION DETAILED: LEFT MEDIAL FOREHEAD
LOCATION DETAILED: RIGHT CLAVICULAR NECK
LOCATION DETAILED: RIGHT SUPERIOR MEDIAL UPPER BACK
LOCATION DETAILED: RIGHT INFERIOR LATERAL MALAR CHEEK
LOCATION DETAILED: RIGHT MEDIAL FOREHEAD
LOCATION DETAILED: LEFT LATERAL SUPERIOR CHEST
LOCATION DETAILED: UPPER STERNUM
LOCATION DETAILED: LEFT SUPERIOR LATERAL BUCCAL CHEEK
LOCATION DETAILED: SUPERIOR MID FOREHEAD
LOCATION DETAILED: LEFT INFERIOR CENTRAL MALAR CHEEK
LOCATION DETAILED: LEFT MEDIAL UPPER BACK
LOCATION DETAILED: LEFT SUPERIOR ANTERIOR NECK

## 2022-08-01 ASSESSMENT — LOCATION ZONE DERM
LOCATION ZONE: NECK
LOCATION ZONE: TRUNK
LOCATION ZONE: NECK
LOCATION ZONE: FACE

## 2022-08-01 NOTE — PROCEDURE: LIQUID NITROGEN
Duration Of Freeze Thaw-Cycle (Seconds): 0
Detail Level: Zone
Post-Care Instructions: I reviewed with the patient in detail post-care instructions. Patient is to wear sunprotection, and avoid picking at any of the treated lesions. Pt may apply Vaseline to crusted or scabbing areas.
Total Number Of Aks Treated: 10
Render Post-Care Instructions In Note?: no
Consent: The patient's consent was obtained including but not limited to risks of crusting, scabbing, blistering, scarring, darker or lighter pigmentary change, recurrence, incomplete removal and infection.

## 2022-08-01 NOTE — PROCEDURE: ADDITIONAL NOTES
Additional Notes: Not exact location. Pt will bring documentation at a later date.
Render Risk Assessment In Note?: no
Detail Level: Simple
Additional Notes: Recommended  NAC 600mg BID. AE discussed

## 2022-11-09 ENCOUNTER — OFFICE VISIT (OUTPATIENT)
Dept: FAMILY MEDICINE CLINIC | Facility: CLINIC | Age: 52
End: 2022-11-09
Payer: COMMERCIAL

## 2022-11-09 VITALS
TEMPERATURE: 99 F | DIASTOLIC BLOOD PRESSURE: 84 MMHG | BODY MASS INDEX: 25.92 KG/M2 | SYSTOLIC BLOOD PRESSURE: 131 MMHG | HEART RATE: 64 BPM | OXYGEN SATURATION: 100 % | WEIGHT: 175 LBS | HEIGHT: 69 IN | RESPIRATION RATE: 18 BRPM

## 2022-11-09 DIAGNOSIS — J06.9 VIRAL URI WITH COUGH: Primary | ICD-10-CM

## 2022-11-09 PROCEDURE — 3008F BODY MASS INDEX DOCD: CPT | Performed by: NURSE PRACTITIONER

## 2022-11-09 PROCEDURE — 99213 OFFICE O/P EST LOW 20 MIN: CPT | Performed by: NURSE PRACTITIONER

## 2022-11-09 PROCEDURE — 3075F SYST BP GE 130 - 139MM HG: CPT | Performed by: NURSE PRACTITIONER

## 2022-11-09 PROCEDURE — 3079F DIAST BP 80-89 MM HG: CPT | Performed by: NURSE PRACTITIONER

## 2022-11-09 RX ORDER — VENLAFAXINE HYDROCHLORIDE 75 MG/1
75 CAPSULE, EXTENDED RELEASE ORAL 2 TIMES DAILY
COMMUNITY
Start: 2022-10-10

## 2022-11-09 RX ORDER — FLUTICASONE PROPIONATE 50 MCG
SPRAY, SUSPENSION (ML) NASAL
Qty: 1 EACH | Refills: 0 | Status: SHIPPED | OUTPATIENT
Start: 2022-11-09

## 2022-11-09 RX ORDER — DEXTROMETHORPHAN HYDROBROMIDE AND PROMETHAZINE HYDROCHLORIDE 15; 6.25 MG/5ML; MG/5ML
SYRUP ORAL
Qty: 75 ML | Refills: 0 | Status: SHIPPED | OUTPATIENT
Start: 2022-11-09

## 2022-11-09 RX ORDER — BENZONATATE 200 MG/1
CAPSULE ORAL
Qty: 20 CAPSULE | Refills: 0 | Status: SHIPPED | OUTPATIENT
Start: 2022-11-09

## 2023-08-07 ENCOUNTER — OFFICE VISIT (OUTPATIENT)
Dept: INTERNAL MEDICINE CLINIC | Facility: CLINIC | Age: 53
End: 2023-08-07

## 2023-08-07 VITALS
DIASTOLIC BLOOD PRESSURE: 83 MMHG | BODY MASS INDEX: 28 KG/M2 | WEIGHT: 189.38 LBS | SYSTOLIC BLOOD PRESSURE: 120 MMHG | HEART RATE: 70 BPM

## 2023-08-07 DIAGNOSIS — Z00.00 PE (PHYSICAL EXAM), ROUTINE: Primary | ICD-10-CM

## 2023-08-07 DIAGNOSIS — R25.3 EYELID TWITCH: ICD-10-CM

## 2023-08-07 DIAGNOSIS — Z12.11 ENCOUNTER FOR SCREENING COLONOSCOPY: ICD-10-CM

## 2023-08-07 DIAGNOSIS — Z12.31 SCREENING MAMMOGRAM, ENCOUNTER FOR: ICD-10-CM

## 2023-08-07 PROCEDURE — 3074F SYST BP LT 130 MM HG: CPT | Performed by: INTERNAL MEDICINE

## 2023-08-07 PROCEDURE — 3079F DIAST BP 80-89 MM HG: CPT | Performed by: INTERNAL MEDICINE

## 2023-08-07 PROCEDURE — 99213 OFFICE O/P EST LOW 20 MIN: CPT | Performed by: INTERNAL MEDICINE

## 2023-08-07 PROCEDURE — 99396 PREV VISIT EST AGE 40-64: CPT | Performed by: INTERNAL MEDICINE

## 2023-08-07 RX ORDER — CYCLOBENZAPRINE HCL 5 MG
5 TABLET ORAL NIGHTLY PRN
Qty: 20 TABLET | Refills: 0 | Status: SHIPPED | OUTPATIENT
Start: 2023-08-07

## 2023-08-07 NOTE — PROGRESS NOTES
HPI:    Patient ID: Sylvie Wilcox is a 48year old female. Patient presents with:  Physical      Patient presents today for school  physical exam, states doing well otherwise, denies any covid 19 sy  aslo  denies chest pain, shortness of breath, dyspnea on exertion or heart palpitations also denies nausea, vomiting, diarrhea, constipation or abdominal pain. No fever, chills, or UTI symptoms. The rest of the review of systems, see below. R  eye twiching on off   seen  neurologist had Botox inj   did not help much   Stress at school work as teacher   No  eye  pain or  disucarge     HPI    Review of Systems   Constitutional:  Negative for chills, fatigue and fever. HENT:  Negative for ear pain and sore throat. Eyes:  Negative for pain and redness. Respiratory:  Negative for cough, shortness of breath and wheezing. Cardiovascular:  Negative for chest pain, palpitations and leg swelling. Gastrointestinal:  Negative for abdominal pain, constipation, diarrhea, nausea and vomiting. Genitourinary:  Negative for dysuria and frequency. Skin:  Negative for pallor. Neurological:  Negative for dizziness and headaches. Psychiatric/Behavioral:  Negative for confusion. The patient is not nervous/anxious. Current Outpatient Medications   Medication Sig Dispense Refill    cyclobenzaprine 5 MG Oral Tab Take 1 tablet (5 mg total) by mouth nightly as needed for Muscle spasms. 20 tablet 0    Venlafaxine HCl (EFFEXOR) 75 MG Oral Tab Take 1 tablet (75 mg total) by mouth daily. 5    fluticasone propionate 50 MCG/ACT Nasal Suspension Instill 2 sprays in each nostril once daily (Patient not taking: Reported on 8/7/2023) 1 each 0    Esomeprazole Magnesium 40 MG Oral Capsule Delayed Release Take by mouth. (Patient not taking: Reported on 5/17/2021)      AmLODIPine Besylate 5 MG Oral Tab Take 5 mg by mouth daily.  (Patient not taking: Reported on 4/5/2022)  3     Allergies:  Azithromycin RASH, HIVES, UNKNOWN   PHYSICAL EXAM:   Physical Exam  Vitals and nursing note reviewed. Constitutional:       General: She is not in acute distress. Appearance: She is well-developed. HENT:      Head: Normocephalic and atraumatic. Right Ear: Tympanic membrane, ear canal and external ear normal.      Left Ear: Tympanic membrane, ear canal and external ear normal.      Nose: Nose normal.      Right Sinus: No maxillary sinus tenderness or frontal sinus tenderness. Left Sinus: No maxillary sinus tenderness or frontal sinus tenderness. Mouth/Throat:      Pharynx: Uvula midline. No oropharyngeal exudate or posterior oropharyngeal erythema. Eyes:      General: Lids are normal. No scleral icterus. Right eye: No discharge. Left eye: No discharge. Neck:      Thyroid: No thyromegaly. Vascular: No JVD. Cardiovascular:      Rate and Rhythm: Normal rate and regular rhythm. Heart sounds: Normal heart sounds. No murmur heard. Pulmonary:      Effort: Pulmonary effort is normal. No respiratory distress. Breath sounds: Normal breath sounds. No wheezing or rales. Abdominal:      Palpations: Abdomen is soft. There is no mass. Tenderness: There is no abdominal tenderness. Musculoskeletal:      Cervical back: Neck supple. Lymphadenopathy:      Cervical: No cervical adenopathy. Skin:     General: Skin is warm and dry. Neurological:      Mental Status: She is alert and oriented to person, place, and time. Psychiatric:         Mood and Affect: Mood is not anxious or depressed. Speech: Speech normal.         Behavior: Behavior normal.         Thought Content: Thought content normal.         Judgment: Judgment normal.      Comments: Doing well on medication          Blood pressure 120/83, pulse 70, weight 189 lb 6.4 oz (85.9 kg), last menstrual period 11/01/2022, not currently breastfeeding.            ASSESSMENT/PLAN:   Physical exam  (primary encounter diagnosis)  Maintain a healthy diet , low saturated fat and low sugar diet  Keep good hydration  Maintain a regular activity /walking as tolerated   Complete labs as ordered,   Preventative health maintenance tests reviewed   Mammogram - order   need pap smear  2021 utd   Coloscopy - refer   Immunizations reviewed  -discussed  recommended covid 19 , flu shot , shingrix  scot -   pt refusing   Patient verbalized understanding and compliance     Screening mammogram, encounter for  Sc mammogram       Right eye twitching chronic   With stress  likely  Refer to  ophtalmology  Seen  neuroologist   Stable   Orders Placed This Encounter      CBC With Differential With Platelet      Comp Metabolic Panel (14)      TSH W Reflex To Free T4      Urinalysis with Culture Reflex      Lipid Panel      Meds This Visit:  Requested Prescriptions     Signed Prescriptions Disp Refills    cyclobenzaprine 5 MG Oral Tab 20 tablet 0     Sig: Take 1 tablet (5 mg total) by mouth nightly as needed for Muscle spasms.        Imaging & Referrals:  GASTRO - INTERNAL  OPHTHALMOLOGY - INTERNAL  DL HILARY 2D+3D SCREENING BILAT (CPT=77067/66281)       JF#[id number]

## 2023-08-08 ENCOUNTER — LAB ENCOUNTER (OUTPATIENT)
Dept: LAB | Age: 53
End: 2023-08-08
Attending: INTERNAL MEDICINE
Payer: COMMERCIAL

## 2023-08-08 ENCOUNTER — HOSPITAL ENCOUNTER (OUTPATIENT)
Dept: MAMMOGRAPHY | Age: 53
Discharge: HOME | End: 2023-08-08
Attending: INTERNAL MEDICINE
Payer: COMMERCIAL

## 2023-08-08 DIAGNOSIS — Z12.31 SCREENING MAMMOGRAM, ENCOUNTER FOR: ICD-10-CM

## 2023-08-08 DIAGNOSIS — Z00.00 PE (PHYSICAL EXAM), ROUTINE: ICD-10-CM

## 2023-08-08 LAB
ALBUMIN SERPL-MCNC: 4.2 G/DL (ref 3.4–5)
ALBUMIN/GLOB SERPL: 1.2 {RATIO} (ref 1–2)
ALP LIVER SERPL-CCNC: 60 U/L
ALT SERPL-CCNC: 26 U/L
ANION GAP SERPL CALC-SCNC: 3 MMOL/L (ref 0–18)
AST SERPL-CCNC: 20 U/L (ref 15–37)
BASOPHILS # BLD AUTO: 0.09 X10(3) UL (ref 0–0.2)
BASOPHILS NFR BLD AUTO: 1.1 %
BILIRUB SERPL-MCNC: 0.6 MG/DL (ref 0.1–2)
BILIRUB UR QL STRIP.AUTO: NEGATIVE
BUN BLD-MCNC: 17 MG/DL (ref 7–18)
CALCIUM BLD-MCNC: 9.5 MG/DL (ref 8.5–10.1)
CHLORIDE SERPL-SCNC: 108 MMOL/L (ref 98–112)
CHOLEST SERPL-MCNC: 265 MG/DL (ref ?–200)
CO2 SERPL-SCNC: 24 MMOL/L (ref 21–32)
COLOR UR AUTO: YELLOW
CREAT BLD-MCNC: 0.9 MG/DL
EGFRCR SERPLBLD CKD-EPI 2021: 77 ML/MIN/1.73M2 (ref 60–?)
EOSINOPHIL # BLD AUTO: 0.12 X10(3) UL (ref 0–0.7)
EOSINOPHIL NFR BLD AUTO: 1.5 %
ERYTHROCYTE [DISTWIDTH] IN BLOOD BY AUTOMATED COUNT: 12.5 %
FASTING PATIENT LIPID ANSWER: YES
FASTING STATUS PATIENT QL REPORTED: YES
GLOBULIN PLAS-MCNC: 3.5 G/DL (ref 2.8–4.4)
GLUCOSE BLD-MCNC: 99 MG/DL (ref 70–99)
GLUCOSE UR STRIP.AUTO-MCNC: NEGATIVE MG/DL
HCT VFR BLD AUTO: 44.9 %
HDLC SERPL-MCNC: 91 MG/DL (ref 40–59)
HGB BLD-MCNC: 14.4 G/DL
IMM GRANULOCYTES # BLD AUTO: 0.03 X10(3) UL (ref 0–1)
IMM GRANULOCYTES NFR BLD: 0.4 %
KETONES UR STRIP.AUTO-MCNC: NEGATIVE MG/DL
LDLC SERPL CALC-MCNC: 164 MG/DL (ref ?–100)
LEUKOCYTE ESTERASE UR QL STRIP.AUTO: NEGATIVE
LYMPHOCYTES # BLD AUTO: 1.3 X10(3) UL (ref 1–4)
LYMPHOCYTES NFR BLD AUTO: 16.2 %
MCH RBC QN AUTO: 30.8 PG (ref 26–34)
MCHC RBC AUTO-ENTMCNC: 32.1 G/DL (ref 31–37)
MCV RBC AUTO: 95.9 FL
MONOCYTES # BLD AUTO: 0.65 X10(3) UL (ref 0.1–1)
MONOCYTES NFR BLD AUTO: 8.1 %
NEUTROPHILS # BLD AUTO: 5.82 X10 (3) UL (ref 1.5–7.7)
NEUTROPHILS # BLD AUTO: 5.82 X10(3) UL (ref 1.5–7.7)
NEUTROPHILS NFR BLD AUTO: 72.7 %
NITRITE UR QL STRIP.AUTO: NEGATIVE
NONHDLC SERPL-MCNC: 174 MG/DL (ref ?–130)
OSMOLALITY SERPL CALC.SUM OF ELEC: 282 MOSM/KG (ref 275–295)
PH UR STRIP.AUTO: 5 [PH] (ref 5–8)
PLATELET # BLD AUTO: 237 10(3)UL (ref 150–450)
POTASSIUM SERPL-SCNC: 4.2 MMOL/L (ref 3.5–5.1)
PROT SERPL-MCNC: 7.7 G/DL (ref 6.4–8.2)
PROT UR STRIP.AUTO-MCNC: NEGATIVE MG/DL
RBC # BLD AUTO: 4.68 X10(6)UL
RBC UR QL AUTO: NEGATIVE
SODIUM SERPL-SCNC: 135 MMOL/L (ref 136–145)
SP GR UR STRIP.AUTO: 1.02 (ref 1–1.03)
TRIGL SERPL-MCNC: 65 MG/DL (ref 30–149)
TSI SER-ACNC: 1.67 MIU/ML (ref 0.36–3.74)
UROBILINOGEN UR STRIP.AUTO-MCNC: <2 MG/DL
VLDLC SERPL CALC-MCNC: 13 MG/DL (ref 0–30)
WBC # BLD AUTO: 8 X10(3) UL (ref 4–11)

## 2023-08-08 PROCEDURE — 77063 BREAST TOMOSYNTHESIS BI: CPT | Performed by: INTERNAL MEDICINE

## 2023-08-08 PROCEDURE — 77067 SCR MAMMO BI INCL CAD: CPT | Performed by: INTERNAL MEDICINE

## 2023-08-08 PROCEDURE — 81001 URINALYSIS AUTO W/SCOPE: CPT

## 2023-08-08 PROCEDURE — 80061 LIPID PANEL: CPT

## 2023-08-08 PROCEDURE — 84443 ASSAY THYROID STIM HORMONE: CPT

## 2023-08-08 PROCEDURE — 80053 COMPREHEN METABOLIC PANEL: CPT

## 2023-08-08 PROCEDURE — 85025 COMPLETE CBC W/AUTO DIFF WBC: CPT

## 2023-08-08 PROCEDURE — 36415 COLL VENOUS BLD VENIPUNCTURE: CPT

## 2024-01-03 ENCOUNTER — APPOINTMENT (OUTPATIENT)
Dept: URBAN - METROPOLITAN AREA CLINIC 244 | Age: 54
Setting detail: DERMATOLOGY
End: 2024-01-03

## 2024-01-03 DIAGNOSIS — L28.1 PRURIGO NODULARIS: ICD-10-CM

## 2024-01-03 DIAGNOSIS — L57.0 ACTINIC KERATOSIS: ICD-10-CM

## 2024-01-03 PROCEDURE — 17110 DESTRUCT B9 LESION 1-14: CPT | Mod: 59

## 2024-01-03 PROCEDURE — OTHER SEPARATE AND IDENTIFIABLE DOCUMENTATION: OTHER

## 2024-01-03 PROCEDURE — OTHER LIQUID NITROGEN: OTHER

## 2024-01-03 PROCEDURE — OTHER COUNSELING: OTHER

## 2024-01-03 PROCEDURE — 99213 OFFICE O/P EST LOW 20 MIN: CPT | Mod: 25

## 2024-01-03 PROCEDURE — 17004 DESTROY PREMAL LESIONS 15/>: CPT

## 2024-01-03 ASSESSMENT — LOCATION DETAILED DESCRIPTION DERM
LOCATION DETAILED: LEFT SUPERIOR PREAURICULAR CHEEK
LOCATION DETAILED: RIGHT SUPERIOR PREAURICULAR CHEEK
LOCATION DETAILED: RIGHT MEDIAL FOREHEAD
LOCATION DETAILED: LEFT MEDIAL FOREHEAD
LOCATION DETAILED: SUPERIOR MID FOREHEAD

## 2024-01-03 ASSESSMENT — LOCATION SIMPLE DESCRIPTION DERM
LOCATION SIMPLE: RIGHT FOREHEAD
LOCATION SIMPLE: LEFT FOREHEAD
LOCATION SIMPLE: LEFT CHEEK
LOCATION SIMPLE: RIGHT CHEEK
LOCATION SIMPLE: SUPERIOR FOREHEAD

## 2024-01-03 ASSESSMENT — LOCATION ZONE DERM: LOCATION ZONE: FACE

## 2024-01-03 NOTE — PROCEDURE: LIQUID NITROGEN
Duration Of Freeze Thaw-Cycle (Seconds): 0
Detail Level: Simple
Post-Care Instructions: I reviewed with the patient in detail post-care instructions. Patient is to wear sunprotection, and avoid picking at any of the treated lesions. Pt may apply Vaseline to crusted or scabbing areas.
Total Number Of Aks Treated: 15
Render Post-Care Instructions In Note?: no
Consent: The patient's consent was obtained including but not limited to risks of crusting, scabbing, blistering, scarring, darker or lighter pigmentary change, recurrence, incomplete removal and infection.
Total Number Of Lesions Treated: 2
Spray Paint Text: The liquid nitrogen was applied to the skin utilizing a spray paint frosting technique.
Medical Necessity Clause: This procedure was medically necessary because the lesions that were treated were:
Medical Necessity Information: It is in your best interest to select a reason for this procedure from the list below. All of these items fulfill various CMS LCD requirements except the new and changing color options.
Detail Level: Zone
Show Spray Paint Technique Variable?: Yes

## 2024-01-04 ENCOUNTER — MED REC SCAN ONLY (OUTPATIENT)
Dept: INTERNAL MEDICINE CLINIC | Facility: CLINIC | Age: 54
End: 2024-01-04

## 2024-04-03 ENCOUNTER — APPOINTMENT (OUTPATIENT)
Dept: URBAN - METROPOLITAN AREA CLINIC 244 | Age: 54
Setting detail: DERMATOLOGY
End: 2024-04-03

## 2024-04-03 DIAGNOSIS — L82.1 OTHER SEBORRHEIC KERATOSIS: ICD-10-CM

## 2024-04-03 DIAGNOSIS — Z85.828 PERSONAL HISTORY OF OTHER MALIGNANT NEOPLASM OF SKIN: ICD-10-CM

## 2024-04-03 DIAGNOSIS — L81.4 OTHER MELANIN HYPERPIGMENTATION: ICD-10-CM

## 2024-04-03 PROBLEM — D48.5 NEOPLASM OF UNCERTAIN BEHAVIOR OF SKIN: Status: ACTIVE | Noted: 2024-04-03

## 2024-04-03 PROCEDURE — OTHER COUNSELING: OTHER

## 2024-04-03 PROCEDURE — OTHER ADDITIONAL NOTES: OTHER

## 2024-04-03 PROCEDURE — 11102 TANGNTL BX SKIN SINGLE LES: CPT

## 2024-04-03 PROCEDURE — 11103 TANGNTL BX SKIN EA SEP/ADDL: CPT

## 2024-04-03 PROCEDURE — OTHER BIOPSY BY SHAVE METHOD: OTHER

## 2024-04-03 PROCEDURE — 99213 OFFICE O/P EST LOW 20 MIN: CPT | Mod: 25

## 2024-04-03 ASSESSMENT — LOCATION ZONE DERM
LOCATION ZONE: FACE
LOCATION ZONE: TRUNK
LOCATION ZONE: NECK

## 2024-04-03 ASSESSMENT — LOCATION SIMPLE DESCRIPTION DERM
LOCATION SIMPLE: RIGHT ANTERIOR NECK
LOCATION SIMPLE: RIGHT CHEEK
LOCATION SIMPLE: RIGHT UPPER BACK
LOCATION SIMPLE: CHEST

## 2024-04-03 ASSESSMENT — LOCATION DETAILED DESCRIPTION DERM
LOCATION DETAILED: LEFT LATERAL SUPERIOR CHEST
LOCATION DETAILED: RIGHT SUPERIOR LATERAL MALAR CHEEK
LOCATION DETAILED: RIGHT CLAVICULAR NECK
LOCATION DETAILED: RIGHT MID-UPPER BACK

## 2024-04-03 NOTE — PROCEDURE: BIOPSY BY SHAVE METHOD
Anesthesia Volume In Cc: 0.5
Bill 77205 For Specimen Handling/Conveyance To Laboratory?: no
Size Of Lesion In Cm: 0
Electrodesiccation And Curettage Text: The wound bed was treated with electrodesiccation and curettage after the biopsy was performed.
Notification Instructions: Patient will be notified of biopsy results. However, patient instructed to call the office if not contacted within 2 weeks.
Consent: Written consent was obtained and risks were reviewed including but not limited to scarring, infection, bleeding, scabbing, incomplete removal, nerve damage and allergy to anesthesia.
Electrodesiccation Text: The wound bed was treated with electrodesiccation after the biopsy was performed.
Dressing: bandage
Anesthesia Type: 0.5% lidocaine with 1:100,000 epinephrine and a 1:10 solution of 8.4% sodium bicarbonate
Information: Selecting Yes will display possible errors in your note based on the variables you have selected. This validation is only offered as a suggestion for you. PLEASE NOTE THAT THE VALIDATION TEXT WILL BE REMOVED WHEN YOU FINALIZE YOUR NOTE. IF YOU WANT TO FAX A PRELIMINARY NOTE YOU WILL NEED TO TOGGLE THIS TO 'NO' IF YOU DO NOT WANT IT IN YOUR FAXED NOTE.
Biopsy Type: H and E
Lab: -[id number]
Depth Of Biopsy: dermis
Cryotherapy Text: The wound bed was treated with cryotherapy after the biopsy was performed.
Type Of Destruction Used: Curettage
Billing Type: Third-Party Bill
Post-Care Instructions: I reviewed with the patient in detail post-care instructions. Patient is to keep the biopsy site dry overnight, and then apply bacitracin twice daily until healed. Patient may apply hydrogen peroxide soaks to remove any crusting.
Hemostasis: Drysol
Curettage Text: The wound bed was treated with curettage after the biopsy was performed.
Detail Level: Detailed
Biopsy Method: double edge Personna blade
Wound Care: Petrolatum
Silver Nitrate Text: The wound bed was treated with silver nitrate after the biopsy was performed.
Was A Bandage Applied: Yes

## 2024-04-03 NOTE — PROCEDURE: ADDITIONAL NOTES
Additional Notes: Not exact location. Pt will bring documentation at a later date.
Render Risk Assessment In Note?: no
Detail Level: Simple

## 2024-04-08 ENCOUNTER — MED REC SCAN ONLY (OUTPATIENT)
Dept: INTERNAL MEDICINE CLINIC | Facility: CLINIC | Age: 54
End: 2024-04-08

## 2024-08-26 ENCOUNTER — OFFICE VISIT (OUTPATIENT)
Dept: PODIATRY CLINIC | Facility: CLINIC | Age: 54
End: 2024-08-26
Payer: COMMERCIAL

## 2024-08-26 DIAGNOSIS — M21.00 ACQUIRED VALGUS DEFORMITY OF BOTH LOWER EXTREMITIES: Primary | ICD-10-CM

## 2024-08-26 DIAGNOSIS — M76.822 POSTERIOR TIBIAL TENDINITIS OF BOTH LOWER EXTREMITIES: ICD-10-CM

## 2024-08-26 DIAGNOSIS — M76.821 POSTERIOR TIBIAL TENDINITIS OF BOTH LOWER EXTREMITIES: ICD-10-CM

## 2024-08-26 PROCEDURE — 99203 OFFICE O/P NEW LOW 30 MIN: CPT | Performed by: STUDENT IN AN ORGANIZED HEALTH CARE EDUCATION/TRAINING PROGRAM

## 2024-08-26 NOTE — PROGRESS NOTES
[facility] Podiatry  Progress Note      Leta Glez is a 54 year old female.   Chief Complaint   Patient presents with    Consult     Consult for new inserts evaluation, hx of bilateral bunionectomy about 10 yrs ago. By accident she trow her inserts 3 yrs ago.         HPI:     Patient is a pleasant 54-year-old female presents to clinic for bilateral foot evaluation.  Patient admits to having bilateral bunionectomies 10 years ago.  Admits to having flatfeet.  She does use her custom orthotics frequently.  Patient would like a new pair of custom orthotics    Allergies: Azithromycin    Current Outpatient Medications   Medication Sig Dispense Refill    cyclobenzaprine 5 MG Oral Tab Take 1 tablet (5 mg total) by mouth nightly as needed for Muscle spasms. 20 tablet 0    fluticasone propionate 50 MCG/ACT Nasal Suspension Instill 2 sprays in each nostril once daily 1 each 0    Esomeprazole Magnesium 40 MG Oral Capsule Delayed Release Take by mouth.      AmLODIPine Besylate 5 MG Oral Tab Take 1 tablet (5 mg total) by mouth daily.  3    Venlafaxine HCl (EFFEXOR) 75 MG Oral Tab Take 1 tablet (75 mg total) by mouth daily.  5      Past Medical History:    Anxiety    Basal cell carcinoma of neck    Excision    Depression    History of abnormal Pap smear    LEEP procedure; per     History of miscarriage    D&C; per     Lipid screening    per     Raynaud's disease    Squamous cell carcinoma of skin of trunk    right chest.  Excision.    Substance abuse (HCC)    alcohol and ativan-sober x 15 months      Past Surgical History:   Procedure Laterality Date    Appendectomy      per NG    D & c  2008    per NG    Foot surgery      rigth bunionectomy 2016    Leep      per       2004    per       2009    per       Family History   Problem Relation Age of Onset    Ovarian Cancer Mother 40        AVELINO/BSO    Cancer Mother         skin cancer ( BCC, SCC)    Other (Other) Mother         Lupus     Other (RA) Mother     Heart Disorder Father 72        heart attack-cause of death    Skin cancer Father     Cancer Sister         skin cancer ( BCC, SCC)     Cancer Maternal Grandmother         uterine; unknown age    Cancer Maternal Uncle         lung ca; smoker    Cancer Paternal Aunt         unknown type; drinker/smoker      Social History     Socioeconomic History    Marital status:    Occupational History    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 Patient is seeing counsler and speaks to sponsor daily    Drug use: Yes     Types: benzodiazepines, Other-see comments     Comment: Ativan    Sexual activity: Yes     Partners: Male     Birth control/protection: Vasectomy   Other Topics Concern    Caffeine Concern Yes     Comment: Soda, Coffee, 3 cups; per NG    Pt has a pacemaker No    Pt has a defibrillator No    Reaction to local anesthetic No           REVIEW OF SYSTEMS:     Denies nause, fever, chills  No calf pain  Denies chest pain or SOB      EXAM:   There were no vitals taken for this visit.  GENERAL: well developed, well nourished, in no apparent distress  EXTREMITIES:   1. Integument: Normal skin temperature and turgor  2. Vascular: Dorsalis pedis two out of four bilateral and posterior tibial pulses two out of   four bilateral, capillary refill normal.   3. Musculoskeletal: All muscle groups are graded 5 out of 5 in the foot and ankle.  Calcaneal valgus of 6 degrees noted bilaterally   4. Neurological: Normal sharp dull sensation; reflexes normal.             ASSESSMENT AND PLAN:   Diagnoses and all orders for this visit:    Acquired valgus deformity of both lower extremities  -     DME - External    Posterior tibial tendinitis of both  lower extremities  -     DME - External        Plan:     Discussed the biomechanical etiology of this condition with the patient as well as the implications this condition has on the feet, ankles, knees, hips and lower back.   Discussed conservative care options which include the use of supportive shoe gear and functional orthotics.  Discussed functional orthotics - orthotics are medically necessary devices to hold the foot in a corrected and rectus position, prevent abnormal motion and stress and reduce pain.         The patient indicates understanding of these issues and agrees to the plan.        Kristen Adams DPM

## 2025-05-28 ENCOUNTER — OFFICE VISIT (OUTPATIENT)
Dept: NEUROLOGY | Facility: CLINIC | Age: 55
End: 2025-05-28
Payer: COMMERCIAL

## 2025-05-28 VITALS
SYSTOLIC BLOOD PRESSURE: 118 MMHG | WEIGHT: 185 LBS | HEART RATE: 73 BPM | RESPIRATION RATE: 16 BRPM | BODY MASS INDEX: 27 KG/M2 | DIASTOLIC BLOOD PRESSURE: 66 MMHG

## 2025-05-28 DIAGNOSIS — G51.31 HEMIFACIAL SPASM OF RIGHT SIDE OF FACE: Primary | ICD-10-CM

## 2025-05-28 PROCEDURE — 3078F DIAST BP <80 MM HG: CPT | Performed by: PHYSICIAN ASSISTANT

## 2025-05-28 PROCEDURE — 99203 OFFICE O/P NEW LOW 30 MIN: CPT | Performed by: PHYSICIAN ASSISTANT

## 2025-05-28 PROCEDURE — 3074F SYST BP LT 130 MM HG: CPT | Performed by: PHYSICIAN ASSISTANT

## 2025-05-28 RX ORDER — OXCARBAZEPINE 150 MG/1
TABLET, FILM COATED ORAL
Qty: 60 TABLET | Refills: 5 | Status: SHIPPED | OUTPATIENT
Start: 2025-05-28

## 2025-05-28 NOTE — PATIENT INSTRUCTIONS
Refill policies:     Contact your pharmacy at least 5 days prior to running out of medication and have them send an electronic request or submit request through the “request refill” option in your [employer] account.  Allow 3-5 business days for refills; controlled substances may take longer.  If your prescription is due for a refill, please make sure you have a follow up appointment scheduled with the appropriate prescribing physician.  To best provide you care, patients receiving routine medications need to be seen at least once a year.  Patients receiving narcotic/controlled substance medications need to be seen at least once every 3 months.  Patients receiving narcotic/controlled substance medications will be required to sign an Opioid Treatment Agreement/Contract.  Refills will not be refilled on weekends or holidays; on-call physicians will not refill routine medications.  No narcotics or controlled substances are refilled after noon on Fridays or by on-call physicians.  Federal Law states narcotics must be electronically prescribed.  A 30-day supply with no refills is the maximum allowed by law.  In the event that your preferred pharmacy does not have the requested medication in stock (e.g., Backordered), it is your responsibility to find another pharmacy that has the requested medication available.  We will gladly send a new prescription to that pharmacy at your request.

## 2025-05-28 NOTE — PROGRESS NOTES
NEUROLOGY  [facility]       Previous visit and existing record notes reviewed in preparation for the face to face visit.  Relevant labs and studies reviewed and will be noted in relevant areas of this record.    Leta Billy Glez  4/14/1970  Primary Care Provider:  Chanell Newton MD    5/28/2025  55 year old female,      Seen for/plans last visit:  Right Hemifacial Spasms    Accompanied visit: No    Present condition:    Patient states that symptoms started 7 years ago now. 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  She noticed some tinnitus in the right ear. Then her right eye began to twitch. At first thought it was related to caffeine consumption and thus decreased it but did not get improvement  It had been occurring for about a year when she went to see a neurologist. Dr. David for evaluation  She had an MRI Brain which was unremarkable. He then recommended botox injections which did not go well   The first time she developed a black eye and the second time she had issues closing her right eye where she needed to wear an eye mask at night  The right sided facial twitching has worsened over the years. It now involves her mouth  At this point it is interfering with her life. She does not like to socialize and has trouble with her driving at times from it  There are no issues on the left side of her face  She has not tried any medication for it      MRI Brain(9/27/18)  Impression   CONCLUSION:     No acute intracranial abnormality.     Approximately 3 mm of inferior cerebellar tonsillar ectopia, indeterminate for Chiari 1 confirmation.         Review of Systems:  Review of Systems:  Denies systemic symptoms     No CP or SOB.  No GI or  symptoms. Relevant Neuro as noted above.    Past Medical History[1]    Medications:    Medications - Current[2]  PRN: PRN Medications[3]    Allergies:  Allergies[4]       EXAM:  /66   Pulse 73   Resp 16   Wt 185 lb (83.9 kg)    BMI 27.32 kg/m²   Looks stated age  General Exam:  HENT:  pink conjunctiva anicteric sclerae  Neck no adenopathy, thyroid normal  Heart and Lungs:  normal  Extremities: no cyanosis, skin changes    NEURO  NAD, A&Ox3  EOMI  Has muscle twitching noted in right cheek, right mouth and right eyelid  Rest of cranial nerves are normal  Strength 5/5 all extremities  DTRs 2+ and symmetric  FTN intact bilaterally  No drift on exam  Normal gait  Slight difficulties with tandem gait  Romberg negative    Problem/s Identified this visit:   1. 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 Referred to Dr. Doris Estrada for botox injections    (X) Discussed potential side effects of any treatment relevant to above.  Includes explanation of tests as necessary.    Return in about 6 months (around 11/28/2025).      Patient understands that if needed, based on condition and or test results, follow up will be readjusted    Radha Quinn PA-C  [facility]  5/28/2025, Time completed 2:07 PM      Patient was seen and examined by Radha Quinn PA-C and Dr. Francisco. Plan was discussed with patient and she expressed full understanding.          [1]   Past Medical History:   Anxiety    Basal cell carcinoma of neck    Excision    Depression    History of abnormal Pap smear    LEEP procedure; per     History of miscarriage    D&C; per     Lipid screening    per     Raynaud's disease    Squamous cell carcinoma of skin of trunk    right chest.  Excision.    Substance abuse (HCC)    alcohol and ativan-sober x 15 months   [2]   Current Outpatient Medications:     OXcarbazepine 150 MG Oral Tab, Take 1 tab po bid, Disp: 60 tablet, Rfl: 5    Venlafaxine HCl (EFFEXOR) 75 MG Oral Tab, Take 1 tablet (75 mg total) by mouth daily., Disp: , Rfl: 5    cyclobenzaprine 5 MG Oral Tab, Take 1 tablet (5 mg total) by mouth nightly as needed  for Muscle spasms. (Patient not taking: Reported on 5/28/2025), Disp: 20 tablet, Rfl: 0  [3] [4]   Allergies  Allergen Reactions    Azithromycin RASH, HIVES and UNKNOWN

## 2025-06-04 ENCOUNTER — TELEPHONE (OUTPATIENT)
Dept: NEUROLOGY | Facility: CLINIC | Age: 55
End: 2025-06-04

## 2025-06-04 ENCOUNTER — PATIENT MESSAGE (OUTPATIENT)
Dept: NEUROLOGY | Facility: CLINIC | Age: 55
End: 2025-06-04

## 2025-06-04 NOTE — TELEPHONE ENCOUNTER
Patient had a ov and was recommended Sam Cardenas  for Hemifacial Spasms botox, he no longer do that procedure can we recommend some one else ?

## 2025-07-07 ENCOUNTER — OFFICE VISIT (OUTPATIENT)
Dept: PODIATRY CLINIC | Facility: CLINIC | Age: 55
End: 2025-07-07
Payer: COMMERCIAL

## 2025-07-07 DIAGNOSIS — M21.6X1 PRONATION DEFORMITY OF BOTH FEET: ICD-10-CM

## 2025-07-07 DIAGNOSIS — M25.572 SINUS TARSI SYNDROME, LEFT: ICD-10-CM

## 2025-07-07 DIAGNOSIS — M21.00 ACQUIRED VALGUS DEFORMITY OF BOTH LOWER EXTREMITIES: Primary | ICD-10-CM

## 2025-07-07 DIAGNOSIS — M21.6X2 PRONATION DEFORMITY OF BOTH FEET: ICD-10-CM

## 2025-07-07 PROCEDURE — 20605 DRAIN/INJ JOINT/BURSA W/O US: CPT | Performed by: PODIATRIST

## 2025-07-07 PROCEDURE — 99213 OFFICE O/P EST LOW 20 MIN: CPT | Performed by: PODIATRIST

## 2025-07-07 RX ORDER — TRIAMCINOLONE ACETONIDE 40 MG/ML
20 INJECTION, SUSPENSION INTRA-ARTICULAR; INTRAMUSCULAR ONCE
Status: COMPLETED | OUTPATIENT
Start: 2025-07-07 | End: 2025-07-07

## 2025-07-07 RX ADMIN — TRIAMCINOLONE ACETONIDE 20 MG: 40 INJECTION, SUSPENSION INTRA-ARTICULAR; INTRAMUSCULAR at 16:48:00

## 2025-07-07 NOTE — PROGRESS NOTES
Per Dr. Dawkins draw up 0.5ml of 0.5% Marcaine and 0.5ml of Kenalog 40 for injection to left foot.

## 2025-07-11 NOTE — PROGRESS NOTES
Leta Glez is a 55 year old female.   Chief Complaint   Patient presents with    New Patient     Left ankle- there is a bone spur on the medial side of ankle- swollen-          HPI:   Patient presents to the clinic she has had some swelling discomfort complains of a bone spur on the side of her ankle which is swollen on the left side where she places her hand is around the medial malleolus and also in the medial arch.  At today's visit reviewed nurse's history as taken above, allergies medications and medical history as documented below.  All changes duly noted  Allergies: Azithromycin   Current Medications[1]   Past Medical History[2]   Past Surgical History[3]   Family History[4]   Social Hx on file[5]        REVIEW OF SYSTEMS:   Today reviewed systens as documented below  GENERAL HEALTH: feels well otherwise  SKIN: Refer to exam below  RESPIRATORY: denies shortness of breath with exertion  CARDIOVASCULAR: denies chest pain on exertion  GI: denies abdominal pain and denies heartburn  NEURO: denies headaches    EXAM:   There were no vitals taken for this visit.  GENERAL: well developed, well nourished, in no apparent distress  EXTREMITIES:   1. Integument: Skin on her left foot and ankle was evaluated compared to the right both sides are warm and dry there is a little swelling around the ankle joint just inferior to the malleolus I am both sides this is around the vicinity of the subtalar joint.  No history of injury or trauma no other skin defects are noted   2. Vascular: Has palpable pulses edema around the left ankle as documented above pulses that she has are symmetrical and equivocal with good capillary turn to the pedal digits   3. Neurologic: Has intact sensorium   4. Musculoskeletal: Patient has most of her tenderness on palpation of the left sinus tarsi area.  Stance position she has fairly significant valgus deformity prominence of the navicular tuberosity on the left which is where the bone  problem she was complaining about.  Pronation deformity is present    ASSESSMENT AND PLAN:   Diagnoses and all orders for this visit:    Acquired valgus deformity of both lower extremities    Pronation deformity of both feet    Sinus tarsi syndrome, left  -     triamcinolone acetonide (Kenalog-40) 40 MG/ML injection 20 mg    Other orders  -     [facility] POD XR - LT FOOT 3 VIEWS(AP,LAT,OBLIQUE) WT BEARING  -     [facility] PODIATRY XR - LEFT FOOT 1 VIEW        Plan: X-rays were exposed 3 views of both feet do confirm significant pronation decreased medial longitudinal arch height increased talar declination angle on both.  At today's visit I explained the nature and extent of this problem I think she is getting some impingement of the subtalar joint although I did not see any significant osteoarthritic changes on the x-rays.Today discussed the nature and extent of a cortisone injection as well as the possible complications and risks.  Patient was in agreement to receive the injection.  The patient was consented for a cortisone injection and after timeout was taken the injection consisting of 20 mg Kenalog and 0.5 cc of 0.5% Marcaine plain was injected into the symptomatic sinus tarsi of the left foot using aseptic technique and appropriate approach.  A Band-Aid was applied to the injection site.   Today recommended wearing a good stability shoe something with a stiff heel counter that we will keep the heel in a more rectus position orthotics may be an eventual treatment.  See her again in a few weeks.  The patient indicates understanding of these issues and agrees to the plan.    Daniel Dawkins DPM       [1]   Current Outpatient Medications   Medication Sig Dispense Refill    OXcarbazepine 150 MG Oral Tab Take 1 tab po bid 60 tablet 5    cyclobenzaprine 5 MG Oral Tab Take 1 tablet (5 mg total) by mouth nightly as needed for Muscle spasms. 20 tablet 0    Venlafaxine HCl (EFFEXOR) 75 MG Oral Tab Take 1 tablet (75 mg  total) by mouth daily.  5   [2]   Past Medical History:   Anxiety    Basal cell carcinoma of neck    Excision    Depression    History of abnormal Pap smear    LEEP procedure; per NG    History of miscarriage    D&C; per NG    Lipid screening    per NG    Raynaud's disease    Squamous cell carcinoma of skin of trunk    right chest.  Excision.    Substance abuse (HCC)    alcohol and ativan-sober x 15 months   [3]   Past Surgical History:  Procedure Laterality Date    Appendectomy      per NG    D & c  2008    per NG    Foot surgery      rigth bunionectomy 2016    Leep      per NG      2004    per NG      2009    per NG   [4]   Family History  Problem Relation Age of Onset    Ovarian Cancer Mother 40        AVELINO/BSO    Cancer Mother         skin cancer ( BCC, SCC)    Other (Other) Mother         Lupus    Other (RA) Mother     Heart Disorder Father 72        heart attack-cause of death    Skin cancer Father     Cancer Sister         skin cancer ( BCC, SCC)     Cancer Maternal Grandmother         uterine; unknown age    Cancer Maternal Uncle         lung ca; smoker    Cancer Paternal Aunt         unknown type; drinker/smoker   [5]   Social History  Socioeconomic History    Marital status:    Occupational History    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 Patient is seeing counsler and speaks to sponsor daily    Drug use: Yes     Types: benzodiazepines, Other-see comments     Comment: Ativan    Sexual activity: Yes     Partners: Male     Birth control/protection: Vasectomy   Other Topics Concern    Caffeine Concern Yes     Comment: Soda, Coffee, 3 cups; per NG    Exercise Yes    Pt has a pacemaker No    Pt has a defibrillator No     Reaction to local anesthetic No

## 2025-07-14 NOTE — TELEPHONE ENCOUNTER
Patient calling back, she can't scheduled  any appointment until the form is complete online. Thanks

## 2025-07-14 NOTE — TELEPHONE ENCOUNTER
Per office visit notes, referral to [facility] is for botox for blepharospasm/hemifacial spasm, not neuro-ophthalmology.  Per [facility] website, this procedure is done by the Eyelid & Orbital Plastic Surgery and Reconstruction team, of which Dr. Mustafa is a part.    Contacted [facility] to determine if a specific referral form is required for this service.  Spoke with Laura at [facility] who confirmed that no form is needed, and patient can contact the clinic and connect to extension [phone] to connect directly with Dr. Mustafa's schedulers.    Spoke with patient and relayed above.  Encouraged her to call back if she encounters any further issues.

## 2025-07-22 ENCOUNTER — OFFICE VISIT (OUTPATIENT)
Dept: INTERNAL MEDICINE CLINIC | Facility: CLINIC | Age: 55
End: 2025-07-22
Payer: COMMERCIAL

## 2025-07-22 ENCOUNTER — LAB ENCOUNTER (OUTPATIENT)
Dept: LAB | Age: 55
End: 2025-07-22
Attending: NURSE PRACTITIONER
Payer: COMMERCIAL

## 2025-07-22 VITALS
WEIGHT: 182.81 LBS | HEIGHT: 69 IN | DIASTOLIC BLOOD PRESSURE: 75 MMHG | SYSTOLIC BLOOD PRESSURE: 112 MMHG | OXYGEN SATURATION: 99 % | HEART RATE: 71 BPM | BODY MASS INDEX: 27.08 KG/M2

## 2025-07-22 DIAGNOSIS — Z00.00 ANNUAL PHYSICAL EXAM: Primary | ICD-10-CM

## 2025-07-22 DIAGNOSIS — Z00.00 ANNUAL PHYSICAL EXAM: ICD-10-CM

## 2025-07-22 DIAGNOSIS — Z12.4 CERVICAL CANCER SCREENING: ICD-10-CM

## 2025-07-22 DIAGNOSIS — G51.31 HEMIFACIAL SPASM OF RIGHT SIDE OF FACE: ICD-10-CM

## 2025-07-22 DIAGNOSIS — Z71.85 VACCINE COUNSELING: ICD-10-CM

## 2025-07-22 DIAGNOSIS — K21.9 GASTROESOPHAGEAL REFLUX DISEASE WITHOUT ESOPHAGITIS: ICD-10-CM

## 2025-07-22 DIAGNOSIS — Z12.31 BREAST CANCER SCREENING BY MAMMOGRAM: ICD-10-CM

## 2025-07-22 DIAGNOSIS — Z12.11 COLON CANCER SCREENING: ICD-10-CM

## 2025-07-22 DIAGNOSIS — E78.00 PURE HYPERCHOLESTEROLEMIA: ICD-10-CM

## 2025-07-22 DIAGNOSIS — F41.9 ANXIETY: ICD-10-CM

## 2025-07-22 LAB
ALBUMIN SERPL-MCNC: 4.8 G/DL (ref 3.2–4.8)
ALBUMIN/GLOB SERPL: 2.2 {RATIO} (ref 1–2)
ALP LIVER SERPL-CCNC: 56 U/L (ref 41–108)
ALT SERPL-CCNC: 20 U/L (ref 10–49)
ANION GAP SERPL CALC-SCNC: 9 MMOL/L (ref 0–18)
AST SERPL-CCNC: 23 U/L (ref ?–34)
BASOPHILS # BLD AUTO: 0.09 X10(3) UL (ref 0–0.2)
BASOPHILS NFR BLD AUTO: 1.6 %
BILIRUB SERPL-MCNC: 0.6 MG/DL (ref 0.3–1.2)
BILIRUB UR QL: NEGATIVE
BUN BLD-MCNC: 11 MG/DL (ref 9–23)
BUN/CREAT SERPL: 11.5 (ref 10–20)
CALCIUM BLD-MCNC: 9.5 MG/DL (ref 8.7–10.4)
CHLORIDE SERPL-SCNC: 103 MMOL/L (ref 98–112)
CHOLEST SERPL-MCNC: 295 MG/DL (ref ?–200)
CLARITY UR: CLEAR
CO2 SERPL-SCNC: 27 MMOL/L (ref 21–32)
CREAT BLD-MCNC: 0.96 MG/DL (ref 0.55–1.02)
DEPRECATED RDW RBC AUTO: 43.9 FL (ref 35.1–46.3)
EGFRCR SERPLBLD CKD-EPI 2021: 71 ML/MIN/1.73M2 (ref 60–?)
EOSINOPHIL # BLD AUTO: 0.15 X10(3) UL (ref 0–0.7)
EOSINOPHIL NFR BLD AUTO: 2.7 %
ERYTHROCYTE [DISTWIDTH] IN BLOOD BY AUTOMATED COUNT: 12.5 % (ref 11–15)
FASTING PATIENT LIPID ANSWER: NO
FASTING STATUS PATIENT QL REPORTED: NO
GLOBULIN PLAS-MCNC: 2.2 G/DL (ref 2–3.5)
GLUCOSE BLD-MCNC: 87 MG/DL (ref 70–99)
GLUCOSE UR-MCNC: NORMAL MG/DL
HCT VFR BLD AUTO: 42.7 % (ref 35–48)
HDLC SERPL-MCNC: 107 MG/DL (ref 40–59)
HGB BLD-MCNC: 14.3 G/DL (ref 12–16)
HGB UR QL STRIP.AUTO: NEGATIVE
IMM GRANULOCYTES # BLD AUTO: 0.01 X10(3) UL (ref 0–1)
IMM GRANULOCYTES NFR BLD: 0.2 %
KETONES UR-MCNC: NEGATIVE MG/DL
LDLC SERPL CALC-MCNC: 179 MG/DL (ref ?–100)
LEUKOCYTE ESTERASE UR QL STRIP.AUTO: NEGATIVE
LYMPHOCYTES # BLD AUTO: 1.6 X10(3) UL (ref 1–4)
LYMPHOCYTES NFR BLD AUTO: 29.1 %
MCH RBC QN AUTO: 31.9 PG (ref 26–34)
MCHC RBC AUTO-ENTMCNC: 33.5 G/DL (ref 31–37)
MCV RBC AUTO: 95.3 FL (ref 80–100)
MONOCYTES # BLD AUTO: 0.44 X10(3) UL (ref 0.1–1)
MONOCYTES NFR BLD AUTO: 8 %
NEUTROPHILS # BLD AUTO: 3.2 X10 (3) UL (ref 1.5–7.7)
NEUTROPHILS # BLD AUTO: 3.2 X10(3) UL (ref 1.5–7.7)
NEUTROPHILS NFR BLD AUTO: 58.4 %
NITRITE UR QL STRIP.AUTO: NEGATIVE
NONHDLC SERPL-MCNC: 188 MG/DL (ref ?–130)
OSMOLALITY SERPL CALC.SUM OF ELEC: 287 MOSM/KG (ref 275–295)
PH UR: 5.5 [PH] (ref 5–8)
PLATELET # BLD AUTO: 232 10(3)UL (ref 150–450)
POTASSIUM SERPL-SCNC: 3.8 MMOL/L (ref 3.5–5.1)
PROT SERPL-MCNC: 7 G/DL (ref 5.7–8.2)
PROT UR-MCNC: NEGATIVE MG/DL
RBC # BLD AUTO: 4.48 X10(6)UL (ref 3.8–5.3)
SODIUM SERPL-SCNC: 139 MMOL/L (ref 136–145)
SP GR UR STRIP: 1.01 (ref 1–1.03)
TRIGL SERPL-MCNC: 64 MG/DL (ref 30–149)
TSI SER-ACNC: 1.45 UIU/ML (ref 0.55–4.78)
UROBILINOGEN UR STRIP-ACNC: NORMAL
VLDLC SERPL CALC-MCNC: 13 MG/DL (ref 0–30)
WBC # BLD AUTO: 5.5 X10(3) UL (ref 4–11)

## 2025-07-22 PROCEDURE — 80061 LIPID PANEL: CPT

## 2025-07-22 PROCEDURE — 99396 PREV VISIT EST AGE 40-64: CPT | Performed by: NURSE PRACTITIONER

## 2025-07-22 PROCEDURE — 84443 ASSAY THYROID STIM HORMONE: CPT

## 2025-07-22 PROCEDURE — 3074F SYST BP LT 130 MM HG: CPT | Performed by: NURSE PRACTITIONER

## 2025-07-22 PROCEDURE — 85025 COMPLETE CBC W/AUTO DIFF WBC: CPT

## 2025-07-22 PROCEDURE — 80053 COMPREHEN METABOLIC PANEL: CPT

## 2025-07-22 PROCEDURE — 3008F BODY MASS INDEX DOCD: CPT | Performed by: NURSE PRACTITIONER

## 2025-07-22 PROCEDURE — 81003 URINALYSIS AUTO W/O SCOPE: CPT | Performed by: NURSE PRACTITIONER

## 2025-07-22 PROCEDURE — 36415 COLL VENOUS BLD VENIPUNCTURE: CPT

## 2025-07-22 PROCEDURE — 3078F DIAST BP <80 MM HG: CPT | Performed by: NURSE PRACTITIONER

## 2025-07-22 NOTE — PROGRESS NOTES
Leta Glez is a 55 year old female.  Chief Complaint   Patient presents with    Physical     HPI:   She presents for her annual physical exam. She has a history of hypercholesterolemia, anxiety and GERD.     PAP smear 5/17/2021  LMP - July 15th irregular periods     She is working out 2x a day. She is watching her diet. Despite these efforts she is not able to lose weight. She thinks this could be cely-menopausal related.     She teaches middle school.   She has a right hemifacial spasm. She is following with neurology. She was referred to Dr Doris Estrada for botox injections. Symptoms started 7 years ago.     She declines penumonia vaccine.   Current Medications[1]   Past Medical History[2]   Past Surgical History[3]   Social History:  Short Social Hx on File[4]   Family History[5]   Allergies[6]     REVIEW OF SYSTEMS:     Review of Systems   Constitutional:  Negative for fever.   HENT: Negative.     Respiratory:  Negative for cough, shortness of breath and wheezing.    Cardiovascular:  Negative for chest pain.   Gastrointestinal: Negative.    Genitourinary: Negative.    Musculoskeletal: Negative.    Skin: Negative.    Neurological: Negative.    Psychiatric/Behavioral: Negative.        Wt Readings from Last 5 Encounters:   07/22/25 182 lb 12.8 oz (82.9 kg)   05/28/25 185 lb (83.9 kg)   08/07/23 189 lb 6.4 oz (85.9 kg)   11/09/22 175 lb (79.4 kg)   04/05/22 185 lb (83.9 kg)     Body mass index is 26.99 kg/m².      EXAM:   /75 (BP Location: Left arm, Patient Position: Sitting, Cuff Size: large)   Pulse 71   Ht 5' 9\" (1.753 m)   Wt 182 lb 12.8 oz (82.9 kg)   LMP 07/15/2025   SpO2 99%   BMI 26.99 kg/m²     Physical Exam  Constitutional:       Appearance: Normal appearance.   HENT:      Head: Normocephalic.      Right Ear: There is impacted cerumen.      Left Ear: There is impacted cerumen.   Eyes:      Extraocular Movements: Extraocular movements intact.      Pupils: Pupils are equal, round, and  reactive to light.   Cardiovascular:      Rate and Rhythm: Normal rate and regular rhythm.      Pulses: Normal pulses.   Pulmonary:      Breath sounds: Normal breath sounds. No wheezing.   Abdominal:      General: Bowel sounds are normal.      Palpations: Abdomen is soft.      Tenderness: There is no abdominal tenderness.   Musculoskeletal:         General: No swelling. Normal range of motion.      Cervical back: Normal range of motion and neck supple.   Skin:     General: Skin is warm and dry.   Neurological:      Mental Status: She is alert and oriented to person, place, and time.   Psychiatric:         Mood and Affect: Mood normal.         Behavior: Behavior normal.            ASSESSMENT AND PLAN:   1. Annual physical exam  - CBC With Differential With Platelet; Future  - Comp Metabolic Panel (14); Future  - Lipid Panel [E]; Future  - TSH W Reflex To Free T4 [E]; Future  - Urinalysis with Culture Reflex    2. Breast cancer screening by mammogram  - [facility] HILARY 2D+3D SCREENING BILAT (CPT=77067/24390); Future    3. Pure hypercholesterolemia  - Lipid Panel [E]; Future    4. Anxiety  - Stable  - Continue Effexor as prescribed.  5. Gastroesophageal reflux disease without esophagitis  -Stable.  Controlled with diet.    6. Colon cancer screening  - [facility] Telephone Colon Screen; Future    7. Cervical cancer screening  -Following with OB  - Last Pap smear with Dr. Mendoza on 5/17/2021  - Discussed with patient to follow-up with OB regarding her Pap smear, irregular periods and perimenopausal symptoms.  She may possibly be interested in hormone replacement therapy depending her options and potential side effects.    8. Vaccine counseling  -Refuses pneumonia vaccine    9. 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  She was referred to an eye specialist.  She will be having Botox completed.        The patient indicates understanding of these issues and agrees to the plan.  Return in about 1 year  (around 2026).         [1]   Current Outpatient Medications   Medication Sig Dispense Refill    Venlafaxine HCl (EFFEXOR) 75 MG Oral Tab Take 1 tablet (75 mg total) by mouth daily.  5   [2]   Past Medical History:   Anxiety    Basal cell carcinoma of neck    Excision    Depression    History of abnormal Pap smear    LEEP procedure; per     History of miscarriage    D&C; per     Lipid screening    per     Raynaud's disease    Squamous cell carcinoma of skin of trunk    right chest.  Excision.    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 Patient is seeing counsler and speaks to sponsor daily    Drug use: Yes     Types: benzodiazepines, Other-see comments     Comment: Ativan    Sexual activity: Yes     Partners: Male     Birth control/protection: Vasectomy   Other Topics Concern    Caffeine Concern Yes     Comment: Soda, Coffee, 3 cups; per     Exercise Yes    Pt has a pacemaker No    Pt has a defibrillator No    Reaction to local anesthetic No     Social Drivers of Health     Food Insecurity: No Food Insecurity (2025)    NCSS - Food Insecurity     Worried About Running Out of Food in the Last Year: No     Ran Out of Food in the Last Year: No   Transportation Needs: No Transportation Needs (2025)    NCSS - Transportation     Lack of Transportation:  No   Housing Stability: Not At Risk (7/22/2025)    NCSS - Housing/Utilities     Has Housing: Yes     Worried About Losing Housing: No     Unable to Get Utilities: No   [5]   Family History  Problem Relation Age of Onset    Ovarian Cancer Mother 40        AVELINO/BSO    Cancer Mother         skin cancer ( BCC, SCC)    Other (Other) Mother         Lupus    Other (RA) Mother     Heart Disorder Father 72        heart attack-cause of death    Skin cancer Father     Cancer Sister         skin cancer ( BCC, SCC)     Cancer Maternal Grandmother         uterine; unknown age    Cancer Maternal Uncle         lung ca; smoker    Cancer Paternal Aunt         unknown type; drinker/smoker   [6]   Allergies  Allergen Reactions    Azithromycin RASH, HIVES and UNKNOWN

## 2025-07-23 ENCOUNTER — RESULTS FOLLOW-UP (OUTPATIENT)
Dept: INTERNAL MEDICINE CLINIC | Facility: CLINIC | Age: 55
End: 2025-07-23

## 2025-07-23 ENCOUNTER — TELEPHONE (OUTPATIENT)
Facility: CLINIC | Age: 55
End: 2025-07-23

## 2025-07-23 DIAGNOSIS — E78.00 PURE HYPERCHOLESTEROLEMIA: Primary | ICD-10-CM

## 2025-07-23 NOTE — TELEPHONE ENCOUNTER
1st attempt to schedule telephone colon screening.     Left message to call back.     Plan to await call back or follow up.     [facility] Staff     Please schedule TCS appointment upon return call.     Thank you!

## 2025-07-25 ENCOUNTER — TELEPHONE (OUTPATIENT)
Facility: CLINIC | Age: 55
End: 2025-07-25

## 2025-07-25 ENCOUNTER — NURSE ONLY (OUTPATIENT)
Facility: CLINIC | Age: 55
End: 2025-07-25

## 2025-07-25 DIAGNOSIS — Z12.11 SPECIAL SCREENING FOR MALIGNANT NEOPLASMS, COLON: Primary | ICD-10-CM

## 2025-07-25 NOTE — PROGRESS NOTES
GI Staff:  TCS Colon Screening Orders    Schedule: Colonoscopy 54347 with MAC or IV     Please send split dose Golytely - will send once scheduled ([facility])     Diagnosis: Colon cancer screening Z12.11    Medication Adjustments: None     >>>Please inform patient if new medications are started after scheduling procedure they need to call clinic to notify us.

## 2025-07-25 NOTE — PROGRESS NOTES
Meets TCS criteria and appropriate to proceed with scheduling.   Medications, pharmacy, and allergies reviewed.               › Age: 55 y.o.   › MD preference: No preference   › Insurance: [employer] PPO   › Last pcp visit: 7/22/25 - Referred by BRIANNE Diaz   › Last CBC: 7/22/25 ([facility]NL)   › Last FOBT/Cologuard: N/A   › H/W: 5'9\" + 179lb   › BMI: 26.4     Telephone Colon Screening Questionnaire Yes No   Are you currently experiencing any GI symptoms [] [x]   If yes, explain     Rectal bleeding [] [x]   Black stool [] [x]   Dysphagia or food \"feeling stuck\" when eating [] [x]   Intractable vomiting [] [x]   Unexplained weight loss [] [x]   First colonoscopy [x] []   Family history of colon cancer [] [x]   Known issues or adverse effects with anesthesia [] [x]   If yes, explain     In the last 12 months, complaints of chest pain or shortness of breath  [] [x]   Referred to a cardiologist?  [] [x]   If yes, explain:      Respiratory: oxygen/JIMY/COPD [] [x]   CPAP/BiPAP  [] [x]   History of heart attack or stroke [] [x]   If yes, in the last 12 months?  [] [x]   History of devices (pacemaker/defibrillator/stent placement) [] [x]     Medication Reconciliation  Yes  No   Anticoagulants [] [x]   Diuretics  [] [x]   ACE Inhibitors/ARB's/Combo [] [x]   Diabetic (oral/insulin)  [] [x]   Weight loss (phentermine/vyvanse/saxsenda/etc) [] [x]   Iron/vitamin E/herbal/multivitamin supplements [] [x]   NSAID/ASA  (ex: aspirin, Ibuprofen, naproxen, meloxicam, ketorolac, celecoxib, sulindac, indomethacin)  [] [x]   Marijuana/CBD/vaping use [] [x]

## 2025-07-25 NOTE — PROGRESS NOTES
YAMINI CARLSON's-    Please send prep to patient's pharmacy, patient is scheduled on 11/19/2025.    Thanks!

## 2025-07-25 NOTE — PROGRESS NOTES
Scheduled for:  Colonoscopy 33521  Provider Name:  Dr. Hargrove  Date:  11/19/2025  Location:  [facility]  Sedation:  MAC  Time:  Patient is aware someone will call the day before to confirm arrival time.  Prep:  Golytely    Meds/Allergies Reconciled?:  Provider Reviewed    Diagnosis with codes:  Colon Cancer Screen Z12.11    Was patient informed to call insurance with codes (Y/N):  Yes     Referral sent?:   Referral was sent at the time of electronic surgical scheduling.     EM or [facility] notified?:  I sent an electronic request to [facility] and received a confirmation today.     Medication Orders:  This patient verbally confirmed that she is not taking:               Iron, blood thinners, BP meds, and is not diabetic               Not latex allergy, Not PCN allergy and does not have a pacemaker. Patient is aware   to hold any type of supplements for 14 days prior procedure.    Misc Orders:       Further instructions given by staff: I discussed the prep instructions with the patient which she verbally understood and is aware that I will send the instructions today.

## 2025-07-25 NOTE — TELEPHONE ENCOUNTER
Your Appointments      Friday July 25, 2025 12:00 PM  Telephone GI Screen with GI COLON SCREENING  [facility] (--) [address]  [facility] [id number] [phone]   Please complete the questionnare in MyChart before your appointment, failure todo so may result in the cancellation of your appointment.

## 2025-07-28 ENCOUNTER — TELEPHONE (OUTPATIENT)
Facility: CLINIC | Age: 55
End: 2025-07-28

## 2025-07-28 RX ORDER — ROSUVASTATIN CALCIUM 20 MG/1
20 TABLET, COATED ORAL NIGHTLY
Qty: 90 TABLET | Refills: 0 | Status: SHIPPED | OUTPATIENT
Start: 2025-07-28

## 2025-08-06 ENCOUNTER — APPOINTMENT (OUTPATIENT)
Dept: URBAN - METROPOLITAN AREA CLINIC 244 | Age: 55
Setting detail: DERMATOLOGY
End: 2025-08-06

## 2025-08-06 DIAGNOSIS — L57.0 ACTINIC KERATOSIS: ICD-10-CM

## 2025-08-06 DIAGNOSIS — Z12.83 ENCOUNTER FOR SCREENING FOR MALIGNANT NEOPLASM OF SKIN: ICD-10-CM

## 2025-08-06 DIAGNOSIS — Z85.828 PERSONAL HISTORY OF OTHER MALIGNANT NEOPLASM OF SKIN: ICD-10-CM

## 2025-08-06 DIAGNOSIS — D22 MELANOCYTIC NEVI: ICD-10-CM

## 2025-08-06 DIAGNOSIS — L82.1 OTHER SEBORRHEIC KERATOSIS: ICD-10-CM

## 2025-08-06 DIAGNOSIS — L81.4 OTHER MELANIN HYPERPIGMENTATION: ICD-10-CM

## 2025-08-06 PROBLEM — D22.9 MELANOCYTIC NEVI, UNSPECIFIED: Status: ACTIVE | Noted: 2025-08-06

## 2025-08-06 PROBLEM — D48.5 NEOPLASM OF UNCERTAIN BEHAVIOR OF SKIN: Status: ACTIVE | Noted: 2025-08-06

## 2025-08-06 PROCEDURE — OTHER BIOPSY BY PUNCH METHOD: OTHER

## 2025-08-06 PROCEDURE — OTHER BIOPSY BY SHAVE METHOD: OTHER

## 2025-08-06 PROCEDURE — OTHER LIQUID NITROGEN: OTHER

## 2025-08-06 PROCEDURE — 17003 DESTRUCT PREMALG LES 2-14: CPT

## 2025-08-06 PROCEDURE — 99213 OFFICE O/P EST LOW 20 MIN: CPT | Mod: 25

## 2025-08-06 PROCEDURE — 17000 DESTRUCT PREMALG LESION: CPT | Mod: 59

## 2025-08-06 PROCEDURE — OTHER COUNSELING: OTHER

## 2025-08-06 PROCEDURE — 11104 PUNCH BX SKIN SINGLE LESION: CPT

## 2025-08-06 PROCEDURE — OTHER MIPS QUALITY: OTHER

## 2025-08-06 PROCEDURE — OTHER DIAGNOSIS COMMENT: OTHER

## 2025-08-06 PROCEDURE — 11103 TANGNTL BX SKIN EA SEP/ADDL: CPT

## 2025-08-06 ASSESSMENT — LOCATION DETAILED DESCRIPTION DERM
LOCATION DETAILED: RIGHT INFERIOR CENTRAL MALAR CHEEK
LOCATION DETAILED: LEFT INFERIOR LATERAL MALAR CHEEK
LOCATION DETAILED: LEFT PROXIMAL PRETIBIAL REGION
LOCATION DETAILED: RIGHT MID PREAURICULAR CHEEK
LOCATION DETAILED: RIGHT CENTRAL MALAR CHEEK

## 2025-08-06 ASSESSMENT — LOCATION SIMPLE DESCRIPTION DERM
LOCATION SIMPLE: LEFT CHEEK
LOCATION SIMPLE: RIGHT CHEEK
LOCATION SIMPLE: LEFT PRETIBIAL REGION

## 2025-08-06 ASSESSMENT — LOCATION ZONE DERM
LOCATION ZONE: LEG
LOCATION ZONE: FACE

## 2025-08-11 ENCOUNTER — MED REC SCAN ONLY (OUTPATIENT)
Dept: INTERNAL MEDICINE CLINIC | Facility: CLINIC | Age: 55
End: 2025-08-11

## 2025-08-11 ENCOUNTER — APPOINTMENT (OUTPATIENT)
Dept: URBAN - METROPOLITAN AREA CLINIC 244 | Age: 55
Setting detail: DERMATOLOGY
End: 2025-08-11

## 2025-08-11 DIAGNOSIS — Z48.02 ENCOUNTER FOR REMOVAL OF SUTURES: ICD-10-CM

## 2025-08-11 PROCEDURE — 99024 POSTOP FOLLOW-UP VISIT: CPT

## 2025-08-11 PROCEDURE — OTHER SUTURE REMOVAL (GLOBAL PERIOD): OTHER

## 2025-08-11 PROCEDURE — OTHER PHOTO-DOCUMENTATION: OTHER

## 2025-08-11 ASSESSMENT — LOCATION SIMPLE DESCRIPTION DERM
LOCATION SIMPLE: LEFT FOREHEAD
LOCATION SIMPLE: LEFT FOREHEAD

## 2025-08-11 ASSESSMENT — LOCATION DETAILED DESCRIPTION DERM
LOCATION DETAILED: LEFT FOREHEAD
LOCATION DETAILED: LEFT FOREHEAD

## 2025-08-11 ASSESSMENT — LOCATION ZONE DERM
LOCATION ZONE: FACE
LOCATION ZONE: FACE

## 2025-08-18 ENCOUNTER — APPOINTMENT (OUTPATIENT)
Dept: URBAN - METROPOLITAN AREA CLINIC 244 | Age: 55
Setting detail: DERMATOLOGY
End: 2025-08-18

## 2025-08-18 DIAGNOSIS — L57.0 ACTINIC KERATOSIS: ICD-10-CM

## 2025-08-18 PROBLEM — C44.319 BASAL CELL CARCINOMA OF SKIN OF OTHER PARTS OF FACE: Status: ACTIVE | Noted: 2025-08-18

## 2025-08-18 PROBLEM — C43.72 MALIGNANT MELANOMA OF LEFT LOWER LIMB, INCLUDING HIP: Status: ACTIVE | Noted: 2025-08-18

## 2025-08-18 PROCEDURE — OTHER COUNSELING: OTHER

## 2025-08-18 PROCEDURE — OTHER PRESCRIPTION: OTHER

## 2025-08-18 PROCEDURE — 99214 OFFICE O/P EST MOD 30 MIN: CPT | Mod: 25

## 2025-08-18 PROCEDURE — 17000 DESTRUCT PREMALG LESION: CPT

## 2025-08-18 PROCEDURE — OTHER DIAGNOSIS COMMENT: OTHER

## 2025-08-18 PROCEDURE — OTHER LIQUID NITROGEN: OTHER

## 2025-08-18 RX ORDER — FLUOROURACIL 5 MG/G
CREAM TOPICAL TWICE A DAY
Qty: 40 | Refills: 1 | Status: ERX | COMMUNITY
Start: 2025-08-18

## 2025-08-18 ASSESSMENT — LOCATION SIMPLE DESCRIPTION DERM
LOCATION SIMPLE: LEFT FOREHEAD
LOCATION SIMPLE: RIGHT CHEEK
LOCATION SIMPLE: LEFT CHEEK
LOCATION SIMPLE: LEFT PRETIBIAL REGION

## 2025-08-18 ASSESSMENT — LOCATION DETAILED DESCRIPTION DERM
LOCATION DETAILED: LEFT MEDIAL FOREHEAD
LOCATION DETAILED: RIGHT INFERIOR CENTRAL MALAR CHEEK
LOCATION DETAILED: LEFT INFERIOR CENTRAL MALAR CHEEK
LOCATION DETAILED: LEFT DISTAL PRETIBIAL REGION

## 2025-08-18 ASSESSMENT — LOCATION ZONE DERM
LOCATION ZONE: FACE
LOCATION ZONE: LEG

## 2025-08-25 ENCOUNTER — APPOINTMENT (OUTPATIENT)
Dept: URBAN - METROPOLITAN AREA CLINIC 244 | Age: 55
Setting detail: DERMATOLOGY
End: 2025-08-25

## 2025-08-25 PROBLEM — C43.72 MALIGNANT MELANOMA OF LEFT LOWER LIMB, INCLUDING HIP: Status: ACTIVE | Noted: 2025-08-25

## 2025-08-25 PROCEDURE — OTHER CASTLE DECISIONDX - MELANOMA: OTHER

## 2025-08-30 ENCOUNTER — HOSPITAL ENCOUNTER (OUTPATIENT)
Dept: MAMMOGRAPHY | Facility: HOSPITAL | Age: 55
Discharge: HOME | End: 2025-08-30
Attending: NURSE PRACTITIONER

## 2025-08-30 DIAGNOSIS — Z12.31 BREAST CANCER SCREENING BY MAMMOGRAM: ICD-10-CM

## 2025-08-30 PROCEDURE — 77067 SCR MAMMO BI INCL CAD: CPT | Performed by: NURSE PRACTITIONER

## 2025-08-30 PROCEDURE — 77063 BREAST TOMOSYNTHESIS BI: CPT | Performed by: NURSE PRACTITIONER

## (undated) NOTE — MR AVS SNAPSHOT
[facility] [address]  [address]  [address] [facility]  [phone]               Thank you for choosing us for your health care visit with NARA Vance MD.  We are glad to serve you and happy to provide you with this summar These medications were sent to [address], [place] - [address], [phone], [phone]  [address] [place], [address] [facility] [address]     Phone:  [phone] - amoxicillin 87

## (undated) NOTE — LETTER
02/19/19        [address] [place] [address]      Dear Luisa Joyce,    Our records indicate that you have outstanding lab work and or testing that was ordered for you and has not yet been completed:  Orders Placed This Encounter

## (undated) NOTE — MR AVS SNAPSHOT
[facility] [place] [address] [place]  [address] [phone]               Thank you for choosing us for your health care visit with Linh Edmond MD.  We are glad to serve you and happy to provide you with this summary of [address] [address] [facility], [address] [facility] [address]     Phone:  [phone]    - Dapsone 7.5 % Gel  - Tretinoin 0.05 % Crea      Information about where to get these medications is not yet available     !  Ask your nurse or doctor about these me

## (undated) NOTE — LETTER
12/27/17        [place] [address]      Dear Oliver All records indicate that you have outstanding lab work and or testing that was ordered for you and has not yet been completed:          CBC W Differential W Plate

## (undated) NOTE — MR AVS SNAPSHOT
[facility]  [address] [place] [address], [address] [place] [address]               Thank you for choosing us for your health care visit with Yumi Byers MD.  We are glad to serve you and happy to provide you with this summary Tretinoin 0.05 % Crea   Apply to the face, acne  as directed at bedtime. Venlafaxine HCl 75 MG Tabs   Take 75 mg by mouth daily.    Commonly known as:  EFFEXOR                Where to Get Your Medications      These medications were sent t

## (undated) NOTE — MR AVS SNAPSHOT
[facility] [address]  [phone]               Thank you for choosing us for your health care visit with Emily Alfaro DPM.  We are glad to serve you and happy to provide you with this summary of yo Referral Details     Referred By    Referred To    Kvng Maxwell DPM   [address]   [address] [address]   Phone:  [phone]   Fax:  [phone]    Diagnoses:  Traumatic ulcer of foot, left, limited to breakdown of skin   Order:  Lin Corona

## (undated) NOTE — LETTER
1/17/2018              Leta Muller [place]        [address] [facility] [address]         To Whom It May Concern,    Avinash Lopez is under my care and was recently tested for TB with a Quantiferon TB test.  The results are negative.

## (undated) NOTE — LETTER
02/19/19        [address] [place] [address]      Dear Rin Ritchie,    Our records indicate that you have outstanding lab work and or testing that was ordered for you and has not yet been completed:  Orders Placed This Encounter

## (undated) NOTE — LETTER
AUTHORIZATION FOR SURGICAL OPERATION OR OTHER PROCEDURE    1. I hereby authorize Dr. Monique Jara , and [facility] staff assigned to my case to perform the following operation and/or procedure at the [facility]:    Endometrial biopsy        2. to Patient:           []  Parent    Responsible person                          []  Spouse  In case of minor or                    [] Other  _____________   Incompetent name:  __________________________________________________

## (undated) NOTE — LETTER
AUTHORIZATION FOR SURGICAL OPERATION OR OTHER PROCEDURE    1. I hereby authorize Dr. Dawkins, and [facility] staff assigned to my case to perform the following operation and/or procedure at the [facility] site:    Left foot cortisone injection   _______________________________________________________________________________________________      _______________________________________________________________________________________________    2.  My physician has explained the nature and purpose of the operation or other procedure, possible alternative methods of treatment, the risks involved, and the possibility of complication to me.  I acknowledge that no guarantee has been made as to the result that may be obtained.  3.  I recognize that, during the course of this operation, or other procedure, unforseen conditions may necessitate additional or different procedure than those listed above.  I, therefore, further authorize and request that the above named physician, his/her physician assistants or designees perform such procedures as are, in his/her professional opinion, necessary and desirable.  4.  Any tissue or organs removed in the operation or other procedure may be disposed of by and at the discretion of the [facility] and [facility].  5.  I understand that in the event of a medical emergency, I will be transported by local paramedics to [facility] or other hospital emergency department.  6.  I certify that I have read and fully understand the above consent to operation and/or other procedure.    7.  I acknowledge that my physician has explained sedation/analgesia administration to me including the risks and benefits.  I consent to the administration of sedation/analgesia as may be necessary or desirable in the judgement of my physician.    Witness signature: ___________________________________________________ Date:   ______/______/_____                    Time:  ________ A.M.  P.M.       Patient Name:  ______________________________________________________  (please print)      Patient signature:  ___________________________________________________             Relationship to Patient:           []  Parent    Responsible person                          []  Spouse  In case of minor or                    [] Other  _____________   Incompetent name:  __________________________________________________                               (please print)      _____________      Responsible person  In case of minor or  Incompetent signature:  _______________________________________________    Statement of Physician  My signature below affirms that prior to the time of the procedure, I have explained to the patient and/or his/her guardian, the risks and benefits involved in the proposed treatment and any reasonable alternative to the proposed treatment.  I have also explained the risks and benefits involved in the refusal of the proposed treatment and have answered the patient's questions.                        Date:  ______/______/_______  Provider                      Signature:  __________________________________________________________       Time:  ___________ A.M    P.M.

## (undated) NOTE — LETTER
AUTHORIZATION FOR SURGICAL OPERATION OR OTHER PROCEDURE    1. I hereby authorize Dr. Jabier Phma and the [facility] staff assigned to my case to perform the following operation and/or procedure at the [facility]:    Botox     2.   My physician has explained t []  Parent    Responsible person                          []  Spouse  In case of minor or                    [] Other  _____________   Incompetent name:  __________________________________________________                               (please prin

## (undated) NOTE — MR AVS SNAPSHOT
[facility] [address]  [phone]               Thank you for choosing us for your health care visit with Micheline Conn DPM.  We are glad to serve you and happy to provide you with this summary of yo Visit [facility] online at  [email or web address]

## (undated) NOTE — Clinical Note
1/16/2017        Dear Heather Lucas MD,    Thank you for allowing me to participate in your patient's care. We appreciate your confidence in their care for your patient.     The patient will find the results of our examination and our treatment recom